# Patient Record
Sex: FEMALE | Race: WHITE | NOT HISPANIC OR LATINO | Employment: OTHER | URBAN - METROPOLITAN AREA
[De-identification: names, ages, dates, MRNs, and addresses within clinical notes are randomized per-mention and may not be internally consistent; named-entity substitution may affect disease eponyms.]

---

## 2021-06-22 ENCOUNTER — TRANSCRIBE ORDERS (OUTPATIENT)
Dept: DIABETES SERVICES | Facility: HOSPITAL | Age: 66
End: 2021-06-22

## 2021-06-22 DIAGNOSIS — E11.9 TYPE 2 DIABETES MELLITUS WITHOUT COMPLICATION, UNSPECIFIED WHETHER LONG TERM INSULIN USE (HCC): Primary | ICD-10-CM

## 2021-07-05 ENCOUNTER — HOSPITAL ENCOUNTER (OUTPATIENT)
Dept: DIABETES SERVICES | Facility: HOSPITAL | Age: 66
Setting detail: RECURRING SERIES
Discharge: HOME | End: 2021-07-05

## 2021-07-05 NOTE — CONSULTS
Diabetes Education    Patient Name:  Coni Steele  YOB: 1955  MRN: [id number]  Admit Date:  7/5/2021        Ms. Steele attended initial diabetes education class--60 minute visit with RN and RD over the phone. This medical referred consult was provided as a telephone call, tele-health or e-visit, as patient is unable to attend an in-office appointment due to the COVID-19 crisis. Consent for treatment was given verbally.Please see media tab for assessment and notes if you use [facility]. If you are not an [facility] user a copy of patient's assessment and notes will be sent per routine. Thank you.       Electronically signed by:  Mariama Dorsey RN, [facility]  07/05/21 10:35 EDT

## 2021-07-28 ENCOUNTER — HOSPITAL ENCOUNTER (OUTPATIENT)
Dept: DIABETES SERVICES | Facility: HOSPITAL | Age: 66
Setting detail: RECURRING SERIES
Discharge: HOME | End: 2021-07-28

## 2023-07-07 PROBLEM — R25.1 TREMORS OF NERVOUS SYSTEM: Status: ACTIVE | Noted: 2023-07-07

## 2023-07-07 PROBLEM — F31.9 BIPOLAR DISORDER: Status: ACTIVE | Noted: 2023-07-07

## 2023-07-07 PROBLEM — E11.65 TYPE 2 DIABETES MELLITUS WITH HYPERGLYCEMIA, WITHOUT LONG-TERM CURRENT USE OF INSULIN: Status: ACTIVE | Noted: 2023-07-07

## 2023-07-07 PROBLEM — I10 PRIMARY HYPERTENSION: Status: ACTIVE | Noted: 2023-07-07

## 2023-08-01 ENCOUNTER — TELEPHONE (OUTPATIENT)
Dept: FAMILY MEDICINE CLINIC | Facility: CLINIC | Age: 68
End: 2023-08-01
Payer: MEDICARE

## 2023-08-01 DIAGNOSIS — I10 PRIMARY HYPERTENSION: ICD-10-CM

## 2023-08-01 DIAGNOSIS — E11.65 TYPE 2 DIABETES MELLITUS WITH HYPERGLYCEMIA, WITHOUT LONG-TERM CURRENT USE OF INSULIN: Primary | ICD-10-CM

## 2023-08-02 RX ORDER — CALCIUM POLYCARBOPHIL 625 MG
625 TABLET ORAL DAILY PRN
COMMUNITY

## 2023-08-09 ENCOUNTER — OFFICE VISIT (OUTPATIENT)
Dept: BEHAVIORAL HEALTH | Facility: CLINIC | Age: 68
End: 2023-08-09
Payer: MEDICARE

## 2023-08-09 VITALS
DIASTOLIC BLOOD PRESSURE: 88 MMHG | SYSTOLIC BLOOD PRESSURE: 126 MMHG | HEART RATE: 98 BPM | BODY MASS INDEX: 22.86 KG/M2 | HEIGHT: 63 IN | WEIGHT: 129 LBS

## 2023-08-09 DIAGNOSIS — G25.9 EXTRAPYRAMIDAL AND MOVEMENT DISORDER: Primary | ICD-10-CM

## 2023-08-09 DIAGNOSIS — F31.70 BIPOLAR DISORDER IN FULL REMISSION, MOST RECENT EPISODE UNSPECIFIED TYPE: ICD-10-CM

## 2023-08-09 RX ORDER — ASENAPINE 5 MG/1
5 TABLET SUBLINGUAL NIGHTLY
Qty: 30 TABLET | Refills: 1 | Status: SHIPPED | OUTPATIENT
Start: 2023-08-09

## 2023-08-09 RX ORDER — BENZTROPINE MESYLATE 1 MG/1
1 TABLET ORAL 2 TIMES DAILY
Qty: 60 TABLET | Refills: 2 | Status: SHIPPED | OUTPATIENT
Start: 2023-08-09

## 2023-08-09 NOTE — PATIENT INSTRUCTIONS
Initiate Cogentin 1 mg twice a day.  Take with food.  Decrease Saphris to 5 mg nightly.  May take half tablet of current medication.  Discontinue Primidone: Take 1/2 tablet for 1 week then stop.
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
## 2023-08-10 ENCOUNTER — TELEPHONE (OUTPATIENT)
Dept: FAMILY MEDICINE CLINIC | Facility: CLINIC | Age: 68
End: 2023-08-10
Payer: MEDICARE

## 2023-08-10 NOTE — TELEPHONE ENCOUNTER
Caller: Coni Steele    Relationship: Self    Best call back number: [phone]    What medications are you currently taking:   Current Outpatient Medications on File Prior to Visit   Medication Sig Dispense Refill    asenapine maleate (SAPHRIS) 5 MG sublingual tablet sublingual tablet Place 1 tablet under the tongue Every Night. 30 tablet 1    B Complex Vitamins (vitamin b complex) capsule capsule Take 1 capsule by mouth Daily. Taking qod      benztropine (COGENTIN) 1 MG tablet Take 1 tablet by mouth 2 (Two) Times a Day. 60 tablet 2    diphenoxylate-atropine (LOMOTIL) 2.5-0.025 MG per tablet Take 2 tablets by mouth 2 (Two) Times a Day As Needed.      estradiol (ESTRACE) 0.1 MG/GM vaginal cream Insert  into the vagina Daily.      ferrous sulfate 324 (65 Fe) MG tablet delayed-release EC tablet Take 1 tablet by mouth Daily With Breakfast. Taking qod      glimepiride (AMARYL) 4 MG tablet Take 1 tablet by mouth 2 (Two) Times a Day. 180 tablet 3    lamoTRIgine (LaMICtal) 150 MG tablet Take 2 tablets by mouth 2 (Two) Times a Day.      lisinopril (PRINIVIL,ZESTRIL) 40 MG tablet Take 1 tablet by mouth Every Morning. 90 tablet 3    metFORMIN ER (GLUCOPHAGE-XR) 500 MG 24 hr tablet Take 2 tablets by mouth Every 12 (Twelve) Hours. 360 tablet 3    multivitamin with minerals tablet tablet Take 1 tablet by mouth Daily.      ondansetron (ZOFRAN) 4 MG tablet Take 1 tablet by mouth Every 8 (Eight) Hours As Needed. for nausea      polycarbophil (calcium polycarbophil) 625 MG tablet tablet Take 1 tablet by mouth Daily As Needed.      rosuvastatin (Crestor) 5 MG tablet Take 1 tablet by mouth Daily. 90 tablet 3     No current facility-administered medications on file prior to visit.       What are your concerns: PT CALLED STATED THAT RX     benztropine (COGENTIN) 1 MG tablet    NEEDS A PA.

## 2023-08-10 NOTE — TELEPHONE ENCOUNTER
Appears someone submitted PA per Media tab on 8/9. However, I'm unable to locate pt or that Key in CoverMyMeds. Key: [id number]

## 2023-08-10 NOTE — TELEPHONE ENCOUNTER
PA Case: [id number], Status: Approved, Coverage Starts on: 5/11/2023 12:00:00 AM, Coverage Ends on: 8/9/2024 12:00:00 AM.

## 2023-08-15 ENCOUNTER — TELEPHONE (OUTPATIENT)
Dept: FAMILY MEDICINE CLINIC | Facility: CLINIC | Age: 68
End: 2023-08-15
Payer: MEDICARE

## 2023-08-15 NOTE — TELEPHONE ENCOUNTER
Diagnostic wire removed. Guidewire tip is intact.  Wire type: In-Q. Pt's daughter, Afsaneh, called concerned about her mother. She states the information that was taken this morning and informs that  Coni had not taken the  Crestor last night and did not take the Prinidone 50mg this morning.  All other meds were taken as normal.  Daughter has been added as the emergency contact and informed she needs to be added to the verbal release to be given information on her mother.

## 2023-08-15 NOTE — TELEPHONE ENCOUNTER
Patient called to check in about medication. Stated that as of 08/13 she has been shaky off balance and a little confused. She stated on 08/14 she had fallen 3 times. She said yesterday morning she woke up in the [facility] on the [place] with no memory of how she got there. She was not sure if she was sleep walking or what happen. She is concerned and just wanted to ask Bonifacio what she needed to do.

## 2023-08-15 NOTE — TELEPHONE ENCOUNTER
Called patient back regarding experience side effects.  She is likely experiencing side effects related to initiation of Cogentin.  I instructed patient to discontinue medication.  She will continue on the decreased dose of Saphris 5 mg.    Patient is to contact me if dizziness does not resolve after a few days of stopping Cogentin.    Instructed patient to follow-up with me as originally scheduled.    Thanks

## 2023-08-16 ENCOUNTER — TELEPHONE (OUTPATIENT)
Dept: FAMILY MEDICINE CLINIC | Facility: CLINIC | Age: 68
End: 2023-08-16
Payer: MEDICARE

## 2023-08-16 DIAGNOSIS — R30.0 DYSURIA: Primary | ICD-10-CM

## 2023-08-16 LAB
BILIRUB BLD-MCNC: NEGATIVE MG/DL
CLARITY, POC: CLEAR
COLOR UR: YELLOW
GLUCOSE UR STRIP-MCNC: NEGATIVE MG/DL
KETONES UR QL: NEGATIVE
LEUKOCYTE EST, POC: ABNORMAL
NITRITE UR-MCNC: NEGATIVE MG/ML
PH UR: 6 [PH] (ref 5–8)
PROT UR STRIP-MCNC: NEGATIVE MG/DL
RBC # UR STRIP: NEGATIVE /UL
SP GR UR: 1.01 (ref 1–1.03)
UROBILINOGEN UR QL: NORMAL

## 2023-08-16 PROCEDURE — 81002 URINALYSIS NONAUTO W/O SCOPE: CPT | Performed by: FAMILY MEDICINE

## 2023-08-16 NOTE — TELEPHONE ENCOUNTER
Caller: Coni Steele    Relationship: Self    Best call back number: [phone]    What orders are you requesting (i.e. lab or imaging): ORDER FOR URANALYSIS     In what timeframe would the patient need to come in: 8/16/23    Where will you receive your lab/imaging services: IN OFFICE     Additional notes: PATIENT HAS HAD LOWER BACK PAIN AND HOT URINE, AND FREQUENCY .  PATIENT WANTS TO LEAVE A URINE SAMP,LE TO TESTED TODAY

## 2023-08-18 LAB
BACTERIA UR CULT: NO GROWTH
BACTERIA UR CULT: NORMAL
MICROALBUMIN UR-MCNC: 31.7 UG/ML

## 2023-08-22 ENCOUNTER — TELEPHONE (OUTPATIENT)
Dept: FAMILY MEDICINE CLINIC | Facility: CLINIC | Age: 68
End: 2023-08-22
Payer: MEDICARE

## 2023-08-22 NOTE — TELEPHONE ENCOUNTER
Patient received voicemail that Urine Culture was Negative. She was curious if Bravo knows any other reason she could still be having symptoms.

## 2023-09-08 ENCOUNTER — OFFICE VISIT (OUTPATIENT)
Dept: FAMILY MEDICINE CLINIC | Facility: CLINIC | Age: 68
End: 2023-09-08
Payer: MEDICARE

## 2023-09-08 ENCOUNTER — OFFICE VISIT (OUTPATIENT)
Dept: BEHAVIORAL HEALTH | Facility: CLINIC | Age: 68
End: 2023-09-08
Payer: MEDICARE

## 2023-09-08 VITALS
RESPIRATION RATE: 16 BRPM | DIASTOLIC BLOOD PRESSURE: 52 MMHG | HEART RATE: 84 BPM | SYSTOLIC BLOOD PRESSURE: 122 MMHG | OXYGEN SATURATION: 99 % | BODY MASS INDEX: 22.22 KG/M2 | HEIGHT: 63 IN | TEMPERATURE: 97.3 F | WEIGHT: 125.4 LBS

## 2023-09-08 VITALS
WEIGHT: 129 LBS | BODY MASS INDEX: 22.86 KG/M2 | SYSTOLIC BLOOD PRESSURE: 94 MMHG | HEIGHT: 63 IN | DIASTOLIC BLOOD PRESSURE: 62 MMHG | HEART RATE: 95 BPM

## 2023-09-08 DIAGNOSIS — F51.05 INSOMNIA DUE TO OTHER MENTAL DISORDER: ICD-10-CM

## 2023-09-08 DIAGNOSIS — G25.9 EXTRAPYRAMIDAL AND MOVEMENT DISORDER: ICD-10-CM

## 2023-09-08 DIAGNOSIS — K52.9 CHRONIC DIARRHEA: ICD-10-CM

## 2023-09-08 DIAGNOSIS — E11.649 TYPE 2 DIABETES MELLITUS WITH HYPOGLYCEMIA WITHOUT COMA, WITHOUT LONG-TERM CURRENT USE OF INSULIN: Primary | ICD-10-CM

## 2023-09-08 DIAGNOSIS — R39.15 URINARY URGENCY: ICD-10-CM

## 2023-09-08 DIAGNOSIS — E53.8 VITAMIN B12 DEFICIENCY: ICD-10-CM

## 2023-09-08 DIAGNOSIS — F31.70 BIPOLAR DISORDER IN FULL REMISSION, MOST RECENT EPISODE UNSPECIFIED TYPE: Primary | ICD-10-CM

## 2023-09-08 DIAGNOSIS — F99 INSOMNIA DUE TO OTHER MENTAL DISORDER: ICD-10-CM

## 2023-09-08 DIAGNOSIS — I10 PRIMARY HYPERTENSION: ICD-10-CM

## 2023-09-08 LAB
EXPIRATION DATE: ABNORMAL
HBA1C MFR BLD: 6.7 %
Lab: ABNORMAL

## 2023-09-08 RX ORDER — GLIPIZIDE 2.5 MG/1
2.5 TABLET, EXTENDED RELEASE ORAL DAILY
Qty: 90 TABLET | Refills: 0 | Status: SHIPPED | OUTPATIENT
Start: 2023-09-08

## 2023-09-08 RX ORDER — LAMOTRIGINE 150 MG/1
150 TABLET ORAL 2 TIMES DAILY
Qty: 60 TABLET | Refills: 2 | Status: SHIPPED | OUTPATIENT
Start: 2023-09-08

## 2023-09-08 NOTE — PROGRESS NOTES
"     Office  Follow Up Visit      Patient Name: Coni Steele  : 1955   MRN: [id number]     Referring Provider: Derrick Cordon MD    Chief Complaint: Medication follow-up    ICD-10-CM ICD-9-CM   1. Bipolar disorder in full remission, most recent episode unspecified type  F31.70 296.80   2. Extrapyramidal and movement disorder  G25.9 333.90   3. Insomnia due to other mental disorder  F51.05 300.9    F99 327.02        History of Present Illness:   Coni Steele is a 68 y.o. female who is here today for follow up related to medication management of bipolar disorder and EPS.  Patient was accompanied by her daughter.  Patient reports that she discontinued benztropine related to side effects which included significant lightheadedness and dizziness.  She reports she has not seen any increase in her EPS symptoms since discontinuing the medication.  Patient reports that she did wean herself off Saphris and states \"I feel good, I feel good mentally right now.\"  Patient denies any manic or depressive symptoms at this time.  Patient affirms since stopping Saphris she has seen a significant increase in sleep disturbances and difficulty with sleep onset and frequent waking.  Patient also reports that she wakes up more than 3 times a night and will have to use the restroom.  She also reports that she has seen that her blood sugars continuously dropping at nighttime.      Subjective      Review of Systems:   Review of Systems   Constitutional: Negative.    Respiratory: Negative.     Cardiovascular: Negative.    Gastrointestinal: Negative.    Genitourinary: Negative.    Musculoskeletal: Negative.    Neurological: Negative.    Psychiatric/Behavioral:  Positive for sleep disturbance.        Patient History:   The following portions of the patient's history were reviewed and updated as appropriate: allergies, current medications, past family history, past medical history, past social history, past surgical history and " "problem list.     Social:  No change in interval history.    Medications:     Current Outpatient Medications:     lamoTRIgine (LaMICtal) 150 MG tablet, Take 1 tablet by mouth 2 (Two) Times a Day., Disp: 60 tablet, Rfl: 2    B Complex Vitamins (vitamin b complex) capsule capsule, Take 1 capsule by mouth Daily. Taking qod, Disp: , Rfl:     diphenoxylate-atropine (LOMOTIL) 2.5-0.025 MG per tablet, Take 2 tablets by mouth 2 (Two) Times a Day As Needed., Disp: , Rfl:     estradiol (ESTRACE) 0.1 MG/GM vaginal cream, Insert  into the vagina Daily., Disp: , Rfl:     ferrous sulfate 324 (65 Fe) MG tablet delayed-release EC tablet, Take 1 tablet by mouth Daily With Breakfast. Taking qod, Disp: , Rfl:     glimepiride (AMARYL) 4 MG tablet, Take 1 tablet by mouth 2 (Two) Times a Day., Disp: 180 tablet, Rfl: 3    lisinopril (PRINIVIL,ZESTRIL) 40 MG tablet, Take 1 tablet by mouth Every Morning., Disp: 90 tablet, Rfl: 3    metFORMIN ER (GLUCOPHAGE-XR) 500 MG 24 hr tablet, Take 2 tablets by mouth Every 12 (Twelve) Hours., Disp: 360 tablet, Rfl: 3    multivitamin with minerals tablet tablet, Take 1 tablet by mouth Daily., Disp: , Rfl:     ondansetron (ZOFRAN) 4 MG tablet, Take 1 tablet by mouth Every 8 (Eight) Hours As Needed. for nausea, Disp: , Rfl:     polycarbophil (calcium polycarbophil) 625 MG tablet tablet, Take 1 tablet by mouth Daily As Needed., Disp: , Rfl:     rosuvastatin (Crestor) 5 MG tablet, Take 1 tablet by mouth Daily., Disp: 90 tablet, Rfl: 3    Objective     Physical Exam:  Vital Signs:   Vitals:    09/08/23 0946   BP: 94/62   Pulse: 95   Weight: 58.5 kg (129 lb)   Height: 158.8 cm (62.5\")     Body mass index is 23.22 kg/m².     Mental Status Exam:   Hygiene:   good  Cooperation:  Cooperative  Eye Contact:  Good  Psychomotor Behavior:  Appropriate  Affect:  Appropriate  Mood: euthymic  Speech:  Normal  Thought Process:  Goal directed  Thought Content:  Mood congruent  Suicidal:  None  Homicidal:  " None  Hallucinations:  None  Delusion:  None  Memory:  Intact  Orientation:  Grossly intact  Reliability:  good  Insight:  Fair  Judgement:  Fair  Impulse Control:  Good  Physical/Medical Issues:  Yes several medical comorbidities, see chart      Assessment / Plan      Visit Diagnosis/Orders Placed This Visit:  Diagnoses and all orders for this visit:    1. Bipolar disorder in full remission, most recent episode unspecified type (Primary)  -     lamoTRIgine (LaMICtal) 150 MG tablet; Take 1 tablet by mouth 2 (Two) Times a Day.  Dispense: 60 tablet; Refill: 2    2. Extrapyramidal and movement disorder    3. Insomnia due to other mental disorder     -Reinitiate trazodone 50 mg from previous prescription.  Patient is to contact me on Tuesday to inform me if she has seen an improvement in her insomnia.    Differential:   N/A    Plan:   Continue lamotrigine 150 mg twice a day.  Reinitiate trazodone 50 mg for insomnia.  Instructed patient to initiate Benadryl over-the-counter for EPS.  Follow-up with primary care provider as scheduled to address blood sugar and urinary frequency.  PCP appointment scheduled on 10/20/2023.    Continue supportive psychotherapy efforts and medications as indicated. Treatment and medication options discussed during today's visit. Patient ackowledged and verbally consented to continue with current treatment plan and was educated on the importance of compliance with treatment and follow-up appointments. Patient seems reasonably able to adhere to treatment plan.      Medication Considerations:  Discussed medication options and treatment plan of prescribed medication(s) as well as the risks, benefits, and potential side effects. Patient is agreeable to call the office with any worsening of symptoms or onset of side effects. Patient is agreeable to call 911 or go to the nearest ER should he/she begin having SI/HI.    Quality Measures:   Never smoker    I advised Coni Steele of the risks of tobacco  use.     Follow Up:   Return in about 1 month (around 10/8/2023) for Recheck.      MARY JO Huerta, PMHNP-BC

## 2023-09-08 NOTE — PROGRESS NOTES
"Chief Complaint   Patient presents with    bladder and low sugar     Pt stated getting up at least 3 times at night to urinate and doesn't feel bladder is emptying-stated that blood sugar has been running low ex:45 pt got up ate yogurt and drank chocolate milk retook sugar level 179       Subjective      Coni Steele is a 68 y.o. who presents for multiple concerns. She is accompanied by her daughter.    DM. Having nocturnal hypoglycemia. Multiple events with glucose to the 40's    Lightheadedness. Psych has suggested bp evaluation. She does not check blood pressure at home.    Urinary urgency/frequency. Occurs day and night with night time symptoms being most significant. Occasional incontinence. Reports urine feels hot. Culture last month was negative.     Diarrhea. Primarily occurs with fruits, raw and cooked veggies.     Nausea. Impairs appetite. Weight down 4 lbs    Objective   Vital Signs:  /52   Pulse 84   Temp 97.3 °F (36.3 °C)   Resp 16   Ht 158.8 cm (62.5\")   Wt 56.9 kg (125 lb 6.4 oz)   SpO2 99%   BMI 22.57 kg/m²     Physical Exam  Vitals reviewed.   Constitutional:       Appearance: Normal appearance.   Neurological:      Mental Status: She is alert.        Result Review   The following data was reviewed by: Derrick Cordon MD on 09/08/2023:  Microalbumin          8/16/2023    14:37   Microalbumin   Microalbumin, Urine 31.7      Data reviewed : A1c 6.7               Assessment and Plan  Diagnoses and all orders for this visit:    1. Type 2 diabetes mellitus with hypoglycemia without coma, without long-term current use of insulin (Primary)  Comments:  Control improved. Stop Amaryl. Start Low dose Glipizide ER. Reassure glucose being slightly higher will not be harmful.  Orders:  -     POC Glycosylated Hemoglobin (Hb A1C)  -     Basic Metabolic Panel  -     glipizide (GLUCOTROL XL) 2.5 MG 24 hr tablet; Take 1 tablet by mouth Daily.  Dispense: 90 tablet; Refill: 0    2. Primary " hypertension  Comments:  Controlled. NO change to regimen at this time. Surveillance labs ordered  Orders:  -     Basic Metabolic Panel  -     CBC & Differential    3. Chronic diarrhea  Comments:  Hold metformin for 2 weeks and return for reassessment.  Orders:  -     Hepatic Function Panel    4. Vitamin B12 deficiency  Comments:  Surveillance labs ordered  Orders:  -     Vitamin B12    5. Urinary urgency  Comments:  May try anticholinergic in the future. Will wait due to multiple med adjustments            Follow Up  No follow-ups on file.  Patient was given instructions and counseling regarding her condition or for health maintenance advice. Please see specific information pulled into the AVS if appropriate.

## 2023-09-09 LAB
ALBUMIN SERPL-MCNC: 4.8 G/DL (ref 3.9–4.9)
ALP SERPL-CCNC: 69 IU/L (ref 44–121)
ALT SERPL-CCNC: 13 IU/L (ref 0–32)
AST SERPL-CCNC: 19 IU/L (ref 0–40)
BASOPHILS # BLD AUTO: 0 X10E3/UL (ref 0–0.2)
BASOPHILS NFR BLD AUTO: 0 %
BILIRUB DIRECT SERPL-MCNC: <0.1 MG/DL (ref 0–0.4)
BILIRUB SERPL-MCNC: <0.2 MG/DL (ref 0–1.2)
BUN SERPL-MCNC: 27 MG/DL (ref 8–27)
BUN/CREAT SERPL: 23 (ref 12–28)
CALCIUM SERPL-MCNC: 10.7 MG/DL (ref 8.7–10.3)
CHLORIDE SERPL-SCNC: 95 MMOL/L (ref 96–106)
CO2 SERPL-SCNC: 22 MMOL/L (ref 20–29)
CREAT SERPL-MCNC: 1.19 MG/DL (ref 0.57–1)
EGFRCR SERPLBLD CKD-EPI 2021: 49 ML/MIN/1.73
EOSINOPHIL # BLD AUTO: 0.1 X10E3/UL (ref 0–0.4)
EOSINOPHIL NFR BLD AUTO: 1 %
ERYTHROCYTE [DISTWIDTH] IN BLOOD BY AUTOMATED COUNT: 12.7 % (ref 11.7–15.4)
GLUCOSE SERPL-MCNC: 292 MG/DL (ref 70–99)
HCT VFR BLD AUTO: 38.1 % (ref 34–46.6)
HGB BLD-MCNC: 12.5 G/DL (ref 11.1–15.9)
IMM GRANULOCYTES # BLD AUTO: 0 X10E3/UL (ref 0–0.1)
IMM GRANULOCYTES NFR BLD AUTO: 1 %
LYMPHOCYTES # BLD AUTO: 1.2 X10E3/UL (ref 0.7–3.1)
LYMPHOCYTES NFR BLD AUTO: 19 %
MCH RBC QN AUTO: 31.1 PG (ref 26.6–33)
MCHC RBC AUTO-ENTMCNC: 32.8 G/DL (ref 31.5–35.7)
MCV RBC AUTO: 95 FL (ref 79–97)
MONOCYTES # BLD AUTO: 0.4 X10E3/UL (ref 0.1–0.9)
MONOCYTES NFR BLD AUTO: 6 %
NEUTROPHILS # BLD AUTO: 4.5 X10E3/UL (ref 1.4–7)
NEUTROPHILS NFR BLD AUTO: 73 %
PLATELET # BLD AUTO: 275 X10E3/UL (ref 150–450)
POTASSIUM SERPL-SCNC: 4.9 MMOL/L (ref 3.5–5.2)
PROT SERPL-MCNC: 6.8 G/DL (ref 6–8.5)
RBC # BLD AUTO: 4.02 X10E6/UL (ref 3.77–5.28)
SODIUM SERPL-SCNC: 136 MMOL/L (ref 134–144)
VIT B12 SERPL-MCNC: 443 PG/ML (ref 232–1245)
WBC # BLD AUTO: 6.1 X10E3/UL (ref 3.4–10.8)

## 2023-09-22 ENCOUNTER — TELEPHONE (OUTPATIENT)
Dept: FAMILY MEDICINE CLINIC | Facility: CLINIC | Age: 68
End: 2023-09-22

## 2023-09-22 ENCOUNTER — OFFICE VISIT (OUTPATIENT)
Dept: FAMILY MEDICINE CLINIC | Facility: CLINIC | Age: 68
End: 2023-09-22
Payer: MEDICARE

## 2023-09-22 VITALS
DIASTOLIC BLOOD PRESSURE: 78 MMHG | HEART RATE: 96 BPM | BODY MASS INDEX: 20.73 KG/M2 | WEIGHT: 117 LBS | TEMPERATURE: 97.5 F | HEIGHT: 63 IN | RESPIRATION RATE: 16 BRPM | SYSTOLIC BLOOD PRESSURE: 130 MMHG

## 2023-09-22 DIAGNOSIS — R41.89 COGNITIVE IMPAIRMENT: ICD-10-CM

## 2023-09-22 DIAGNOSIS — K52.9 CHRONIC DIARRHEA: ICD-10-CM

## 2023-09-22 DIAGNOSIS — E11.649 TYPE 2 DIABETES MELLITUS WITH HYPOGLYCEMIA WITHOUT COMA, WITHOUT LONG-TERM CURRENT USE OF INSULIN: ICD-10-CM

## 2023-09-22 DIAGNOSIS — E11.65 TYPE 2 DIABETES MELLITUS WITH HYPERGLYCEMIA, WITHOUT LONG-TERM CURRENT USE OF INSULIN: Primary | ICD-10-CM

## 2023-09-22 PROCEDURE — 3078F DIAST BP <80 MM HG: CPT | Performed by: FAMILY MEDICINE

## 2023-09-22 PROCEDURE — 3044F HG A1C LEVEL LT 7.0%: CPT | Performed by: FAMILY MEDICINE

## 2023-09-22 PROCEDURE — 3075F SYST BP GE 130 - 139MM HG: CPT | Performed by: FAMILY MEDICINE

## 2023-09-22 PROCEDURE — 99214 OFFICE O/P EST MOD 30 MIN: CPT | Performed by: FAMILY MEDICINE

## 2023-09-22 RX ORDER — GLIPIZIDE 5 MG/1
5 TABLET, FILM COATED, EXTENDED RELEASE ORAL DAILY
Qty: 30 TABLET | Refills: 0 | Status: SHIPPED | OUTPATIENT
Start: 2023-09-22

## 2023-09-22 RX ORDER — BLOOD SUGAR DIAGNOSTIC
STRIP MISCELLANEOUS
Qty: 30 EACH | Refills: 12 | Status: SHIPPED | OUTPATIENT
Start: 2023-09-22

## 2023-09-22 NOTE — TELEPHONE ENCOUNTER
Trouble with thoughts they are consuming and forgetting on a regular bases about little task. Stated that for a example if she us nauseous, she can't remember to take a nausea pill due to feeling like her heads spinning with thoughts and just can't remember to take a pill due to this. Wanted to let tara know a update in regards to this

## 2023-09-22 NOTE — PROGRESS NOTES
"Chief Complaint   Patient presents with    2wk f/u diarrhea     Noticed a drop in her wt. Stated she's been nauseous.        Subjective      Coni Steele is a 68 y.o. who presents for diabetes, chronic diarrhea.  At last visit we made medication adjustments to diminish hypoglycemia and see if discontinuation of metformin resolved diarrhea.  Patient is accompanied by her daughter.  Unfortunately the patient has poor memory and does not recall any blood sugars other than a glucose of 380 this morning.  She brings in a piece of paper that would indicate she took metformin this morning and has not taken her glipizide.  She believes the diarrhea may be better and the daughter seems to believe patient has mostly stopped metformin.  Patient's daughter became quite upset when witnessing her mother's cognitive impairment    The following portions of the patient's history were reviewed and updated as appropriate: allergies, current medications, past family history, past medical history, past social history, past surgical history, and problem list.    Review of Systems    Objective   Vital Signs:  /78   Pulse 96   Temp 97.5 °F (36.4 °C)   Resp 16   Ht 158.8 cm (62.5\")   Wt 53.1 kg (117 lb)   BMI 21.06 kg/m²     BMI is within normal parameters. No other follow-up for BMI required.        Physical Exam  Vitals reviewed.   Constitutional:       Appearance: Normal appearance.   Cardiovascular:      Rate and Rhythm: Normal rate and regular rhythm.   Pulmonary:      Effort: Pulmonary effort is normal.   Neurological:      Mental Status: She is alert.        Result Review                     Assessment and Plan  Diagnoses and all orders for this visit:    1. Type 2 diabetes mellitus with hyperglycemia, without long-term current use of insulin (Primary)  -     glucose blood (Accu-Chek Whit Plus) test strip; Check blood sugar daily  Dispense: 30 each; Refill: 12    2. Chronic diarrhea    3. Type 2 diabetes mellitus with " hypoglycemia without coma, without long-term current use of insulin  Comments:  Control improved. Stop Amaryl. Start Low dose Glipizide ER. Reassure glucose being slightly higher will not be harmful.  Orders:  -     glipizide (GLUCOTROL XL) 5 MG ER tablet; Take 1 tablet by mouth Daily.  Dispense: 30 tablet; Refill: 0    4. Cognitive impairment    Plan  1.  Increase glipizide ER to 5 mg daily.  Return to office in 2 weeks with a list of daily blood sugars  2.  Discontinue metformin  3.  Patient's daughter will try to make sure patient follows the plan and will accompany her mother to visits        Follow Up  No follow-ups on file.  Patient was given instructions and counseling regarding her condition or for health maintenance advice. Please see specific information pulled into the AVS if appropriate.

## 2023-09-22 NOTE — TELEPHONE ENCOUNTER
Caller: SHAW CARRERA    Relationship: Emergency Contact    Best call back number:       [phone] (Home)     Which medication are you concerned about:       glipizide (GLUCOTROL XL) 5 MG ER tablet     Who prescribed you this medication:     DR SALMON    What are your concerns:     CALLER/DAUGHTER STATED MEDICATION LISTED ABOVE AFTER INCREASING DOSAGE TO (5) MG WAS TO BE FORWARDED TO PHARMACY FOR FILLING    CALLER STATED PHARMACY HAS NOT RECEIVED THE PRESCRIPTION FILL AS OF YET    PREFERRED PHARMACY:    [facility] - [place], [place]    TELEPHONE CONTACT:    [phone]

## 2023-09-26 ENCOUNTER — TELEPHONE (OUTPATIENT)
Dept: FAMILY MEDICINE CLINIC | Facility: CLINIC | Age: 68
End: 2023-09-26
Payer: MEDICARE

## 2023-09-26 NOTE — TELEPHONE ENCOUNTER
BRIAN IS VERY UNSTABLE STABLE ON HER FEET. SHE ISN'T ABLE TO STAND FOR PERIODS OF TIMES. PT ALSO HAD FALLEN YESTERDAY 2 TIMES. SHE HIT HER HEAD ONCE WHEN SHE HAD FALLEN DOWN THE STAIRS. SHE WAS NOT ABLE TO TELL HER DAUGHTER WHEN SHE HAD FALLEN. NOW SHE HAS A BLACK EYE ON THE RIGHT SIDE AND IS TENDER TO THE TOUCH.       HER DAUGHTER HAD ALSO TAKEN HER BLOOD SUGAR YESTERDAY @ 530P - 239  09/26- 7:30am - 150    SHAW STATED THAT SHE IS GOING TO TAKE HER TO THE HOSPITAL DUE TO SHE IS REALLY SPACEY AND NOT HER SELF.

## 2023-09-26 NOTE — TELEPHONE ENCOUNTER
WANTED TO UPDATE THE SUGAR NUMBERS SHE WAS MISTAKEN BEFORE   239 AT 5:30PM YESTERDAY AFTER CH[employer]EN AND RICE   150 AT 11PM YESTERDAY   208 AT 7AM TODAY   263 AT 8:55AM TODAY AFTER EATING A SMALL LOW SUGAR YOGART

## 2023-09-26 NOTE — TELEPHONE ENCOUNTER
Based on the fall and injury sustained from this it sounds like she could have a concussion.  I would recommend taking her to the emergency room

## 2023-09-26 NOTE — TELEPHONE ENCOUNTER
Caller: SHAW CARRERA    Relationship: Emergency Contact    Best call back number: [phone]     What is the best time to reach you: ANY    Who are you requesting to speak with (clinical staff, provider,  specific staff member): CLINICAL        What was the call regarding: PATIENTS DAUGHTER SHAW STATES HER MOM IS HAVING DIFFICULTY REMEMBERING THINGS AND SHE HAD A FALL LAST NIGHT. SHAW STATES SHE IS FREQUENTLY DIZZY. SHE IS VERY CONCERNED AND WOULD LIKE A CALL BACK TO DISCUSS.

## 2023-10-03 ENCOUNTER — TELEPHONE (OUTPATIENT)
Dept: FAMILY MEDICINE CLINIC | Facility: CLINIC | Age: 68
End: 2023-10-03
Payer: MEDICARE

## 2023-10-03 NOTE — TELEPHONE ENCOUNTER
Caller: SHAW CARRERA    Relationship to patient: Emergency Contact    Best call back number: [phone]     PATIENT'S DAUGHTER STATES THAT SHE HAS BEEN HOSPITALIZED AT [facility]  FOR A FALL, BUT HER SUGAR IS OVER 200 AND SHE IS WANTING TO TALK WITH DR SALMON TO SEE WHAT NEEDS TO BE DONE.

## 2023-10-04 NOTE — TELEPHONE ENCOUNTER
While the patient is hospitalized she is likely getting insulin injections.  I cannot make any changes while she is in the hospital.

## 2023-10-19 ENCOUNTER — TELEPHONE (OUTPATIENT)
Dept: FAMILY MEDICINE CLINIC | Facility: CLINIC | Age: 68
End: 2023-10-19

## 2023-10-19 NOTE — TELEPHONE ENCOUNTER
Hub staff attempted to follow warm transfer process and was unsuccessful     Caller: SHAW CARRERA    Relationship to patient: Emergency Contact    Best call back number     Patient is needing: PATIENT IS IN REHAB CURRENTLY AND DAUGHTER ADVISED THAT THE PATIENTS SUGAR LEVELS HAVE NOT BEEN STABLE; SHE WAS SCHEDULED WITH DR SALMON TOMORROW FOR JUST A REGULAR OFFICE VISIT, BUT THIS WOULD NEED TO BE A HOSPITAL FOLLOW UP; SHE HAS CONCERNS ABOUT THIS APPTFOR TOMORROW    PLEASE CALL TO ADVISE

## 2023-10-19 NOTE — TELEPHONE ENCOUNTER
Talked to daughter- she is wanting to wait on a appointment due to her being in rehab and being moved over to a independent living but this has not happened yet. Daughter is concerned due to sugar levels being in the 200's. Wanted to know if dr snow can change her medication around again? She said rehab has just been giving insulin to keep sugar down.     Daughter wanted to know if she needs a ivan they can make one for this but just wanted guidance from dr snow what he thought was best?

## 2023-10-19 NOTE — TELEPHONE ENCOUNTER
If the patient is currently in a facility there should be a medical doctor who assist with managing chronic diseases such as diabetes.  Where is the patient right now?  Where will she be going?

## 2023-10-20 NOTE — TELEPHONE ENCOUNTER
Daughter Afsaneh said she's at [facility] in [place] then going to [facility] in [place]. It's an independent living facility.     Informed her there should be a Dr there to assist in DM management etc. Suggested speaking with charge nurse if needed.

## 2023-10-27 ENCOUNTER — OFFICE VISIT (OUTPATIENT)
Dept: FAMILY MEDICINE CLINIC | Facility: CLINIC | Age: 68
End: 2023-10-27
Payer: MEDICARE

## 2023-10-27 VITALS
DIASTOLIC BLOOD PRESSURE: 80 MMHG | WEIGHT: 129.1 LBS | BODY MASS INDEX: 22.88 KG/M2 | HEART RATE: 55 BPM | HEIGHT: 63 IN | SYSTOLIC BLOOD PRESSURE: 120 MMHG | OXYGEN SATURATION: 98 %

## 2023-10-27 DIAGNOSIS — E11.65 TYPE 2 DIABETES MELLITUS WITH HYPERGLYCEMIA, WITHOUT LONG-TERM CURRENT USE OF INSULIN: Primary | ICD-10-CM

## 2023-10-27 DIAGNOSIS — N76.0 ACUTE VAGINITIS: ICD-10-CM

## 2023-10-27 DIAGNOSIS — Z13.29 SCREENING FOR THYROID DISORDER: ICD-10-CM

## 2023-10-27 DIAGNOSIS — E53.8 VITAMIN B12 DEFICIENCY: ICD-10-CM

## 2023-10-27 DIAGNOSIS — E55.9 VITAMIN D DEFICIENCY: ICD-10-CM

## 2023-10-27 DIAGNOSIS — F31.9 BIPOLAR AFFECTIVE DISORDER, REMISSION STATUS UNSPECIFIED: ICD-10-CM
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
## 2023-10-27 PROBLEM — R10.9 ABDOMINAL PAIN: Status: ACTIVE | Noted: 2023-09-26

## 2023-10-27 PROBLEM — F22 DELUSIONS: Status: ACTIVE | Noted: 2017-03-15

## 2023-10-27 LAB
BILIRUB BLD-MCNC: NEGATIVE MG/DL
CLARITY, POC: CLEAR
COLOR UR: YELLOW
EXPIRATION DATE: ABNORMAL
GLUCOSE UR STRIP-MCNC: ABNORMAL MG/DL
KETONES UR QL: NEGATIVE
LEUKOCYTE EST, POC: NEGATIVE
Lab: ABNORMAL
NITRITE UR-MCNC: NEGATIVE MG/ML
PH UR: 5 [PH] (ref 5–8)
PROT UR STRIP-MCNC: NEGATIVE MG/DL
RBC # UR STRIP: NEGATIVE /UL
SP GR UR: 1.01 (ref 1–1.03)
UROBILINOGEN UR QL: ABNORMAL

## 2023-10-27 RX ORDER — LINAGLIPTIN 5 MG/1
5 TABLET, FILM COATED ORAL DAILY
COMMUNITY
Start: 2023-10-21

## 2023-10-27 RX ORDER — PROPRANOLOL HYDROCHLORIDE 10 MG/1
10 TABLET ORAL 3 TIMES DAILY
COMMUNITY

## 2023-10-27 RX ORDER — FLUCONAZOLE 150 MG/1
150 TABLET ORAL DAILY
Qty: 2 TABLET | Refills: 0 | Status: SHIPPED | OUTPATIENT
Start: 2023-10-27 | End: 2023-10-29

## 2023-10-27 NOTE — PATIENT INSTRUCTIONS
Local Options for Counseling:  [facility]- [phone]  [facility]- [place] Office- [phone]  [place] [facility]- [phone]  [facility]- [phone]

## 2023-10-27 NOTE — PROGRESS NOTES
Office Note     Name: Coni Steele    : 1955     MRN: [id number]     Chief Complaint  Hospital Follow Up Visit    Subjective     History of Present Illness:  Coni Steele is a 68 y.o. female who presents today for eval after [place] [facility] [place]. She states that they got her medications adjusted, and she has been doing much better. She is following up with a therapist, but she does not have a psychiatrist.     She states that she lives at [facility], so she has some assistance.He daughter also helps her.     She c/o vaginal itching and irritation. She denies urinary symptoms.     Review of Systems:   Review of Systems   Constitutional:  Negative for fever.   Genitourinary:  Negative for dysuria, flank pain, hematuria, urgency, urinary incontinence and vaginal discharge.       Past Medical History:   Past Medical History:   Diagnosis Date    Anxiety     Depression     Diabetes mellitus     Hypertension        Past Surgical History:   Past Surgical History:   Procedure Laterality Date    APPENDECTOMY      CHOLECYSTECTOMY         Family History: History reviewed. No pertinent family history.    Social History:   Social History     Socioeconomic History    Marital status:    Tobacco Use    Smoking status: Never    Smokeless tobacco: Never   Vaping Use    Vaping Use: Never used   Substance and Sexual Activity    Alcohol use: Never    Drug use: Never    Sexual activity: Defer       Immunizations:   Immunization History   Administered Date(s) Administered    COVID-19 (PFIZER) Purple Cap Monovalent 2021, 2021, 2022    Flu Vaccine Quad PF 6-35MO 10/15/2019    Fluzone (or Fluarix & Flulaval for VFC) >6mos 2021    Tdap 2019        Medications:     Current Outpatient Medications:     glucose blood (Accu-Chek Whit Plus) test strip, Check blood sugar daily, Disp: 30 each, Rfl: 12    lamoTRIgine (LaMICtal) 150 MG tablet, Take 1 tablet  "by mouth 2 (Two) Times a Day., Disp: 60 tablet, Rfl: 2    lisinopril (PRINIVIL,ZESTRIL) 40 MG tablet, Take 1 tablet by mouth Every Morning., Disp: 90 tablet, Rfl: 3    metFORMIN (GLUCOPHAGE) 500 MG tablet, Take 1 tablet by mouth., Disp: , Rfl:     multivitamin with minerals tablet tablet, Take 1 tablet by mouth Daily., Disp: , Rfl:     ondansetron (ZOFRAN) 4 MG tablet, Take 1 tablet by mouth Every 8 (Eight) Hours As Needed. for nausea, Disp: , Rfl:     polycarbophil 625 MG tablet tablet, Take 1 tablet by mouth Daily As Needed., Disp: , Rfl:     Tradjenta 5 MG tablet tablet, Take 1 tablet by mouth Daily., Disp: , Rfl:     propranolol (INDERAL) 10 MG tablet, Take 1 tablet by mouth 3 (Three) Times a Day., Disp: , Rfl:     Allergies:   Allergies   Allergen Reactions    Phenothiazines Anaphylaxis    Shellfish-Derived Products Anaphylaxis    Phenylephrine Other (See Comments)     Patient does not know what her intolerance is    Indomethacin Unknown - Low Severity    Levofloxacin Other (See Comments)     'Felt like something was going to snap in the back of my leg'     Seroquel Xr [Quetiapine Fumarate Er] Unknown - Low Severity       Objective     Vital Signs  /80 (BP Location: Left arm, Patient Position: Sitting, Cuff Size: Adult)   Pulse 55   Ht 158.8 cm (62.5\")   Wt 58.6 kg (129 lb 1.6 oz)   SpO2 98%   BMI 23.24 kg/m²   Estimated body mass index is 23.24 kg/m² as calculated from the following:    Height as of this encounter: 158.8 cm (62.5\").    Weight as of this encounter: 58.6 kg (129 lb 1.6 oz).    BMI is within normal parameters. No other follow-up for BMI required.      Physical Exam  Vitals reviewed.   Constitutional:       General: She is not in acute distress.     Appearance: Normal appearance. She is not ill-appearing.   HENT:      Head: Normocephalic and atraumatic.   Cardiovascular:      Rate and Rhythm: Normal rate and regular rhythm.      Pulses: Normal pulses.      Heart sounds: Normal heart " sounds.   Pulmonary:      Effort: Pulmonary effort is normal. No respiratory distress.      Breath sounds: Normal breath sounds.   Neurological:      General: No focal deficit present.      Mental Status: She is alert and oriented to person, place, and time.   Psychiatric:         Mood and Affect: Mood normal.         Behavior: Behavior normal.       Results:   Brief Urine Lab Results  (Last result in the past 365 days)        Color   Clarity   Blood   Leuk Est   Nitrite   Protein   CREAT   Urine HCG        10/27/23 1256 Yellow   Clear   Negative   Negative   Negative   Negative                  Assessment and Plan     Assessment/Plan:  Diagnoses and all orders for this visit:    1. Type 2 diabetes mellitus with hyperglycemia, without long-term current use of insulin (Primary)  Assessment & Plan:  Diabetes status is unknown .   Continue current treatment regimen.  Diabetes will be reassessed in 3 months.    Orders:  -     Hemoglobin A1c; Future  -     CBC Auto Differential; Future  -     Comprehensive Metabolic Panel; Future  -     Lipid Panel; Future    2. Acute vaginitis  -     POC Urinalysis Dipstick, Automated  -     fluconazole (DIFLUCAN) 150 MG tablet; Take 1 tablet by mouth Daily for 2 doses. On Days 1 and 3.  Dispense: 2 tablet; Refill: 0    3. Bipolar affective disorder, remission status unspecified  Assessment & Plan:  I recommend that she continue with therapy, and we will schedule her with psychiatric medication management.     Orders:  -     Ambulatory Referral to Behavioral Health    4. Vitamin B12 deficiency  -     Vitamin B12; Future    5. Vitamin D deficiency  -     Vitamin D,25-Hydroxy; Future    6. Screening for thyroid disorder  -     Thyroid Cascade Profile; Future        Follow Up  Return in about 4 weeks (around 11/24/2023) for recheck.    Florence Gonasles PA-C  [facility]

## 2023-11-13 NOTE — TELEPHONE ENCOUNTER
Caller: SHAW CARRERA    Relationship: Emergency Contact    Best call back number: [phone]    Requested Prescriptions:   Requested Prescriptions     Pending Prescriptions Disp Refills    metFORMIN (GLUCOPHAGE) 500 MG tablet       Sig: Take 1 tablet by mouth.        Pharmacy where request should be sent: [facility] [id number] [place] [id number]   [address] - [phone]  - [phone] FX     Last office visit with prescribing clinician: 10/27/2023   Last telemedicine visit with prescribing clinician: Visit date not found   Next office visit with prescribing clinician: 11/20/2023     Additional details provided by patient: PATIENT IS OUT OF MEDICATION AND NEEDS REFILL SINCE DOSAGE CHANGE TO 1 TABLET BID.     Does the patient have less than a 3 day supply:  [x] Yes  [] No    Would you like a call back once the refill request has been completed: [x] Yes [] No    If the office needs to give you a call back, can they leave a voicemail: [x] Yes [] No    Indira Aly Rep   11/13/23 10:00 EST

## 2023-11-13 NOTE — ASSESSMENT & PLAN NOTE
I recommend that she continue with therapy, and we will schedule her with psychiatric medication management.

## 2023-11-13 NOTE — TELEPHONE ENCOUNTER
Patients daughter calling back about refill request, states she is concerned because her mother is completley out of medication. She also wanted to verify that those be [facility] to [facility] [address]

## 2023-11-13 NOTE — ASSESSMENT & PLAN NOTE
Diabetes status is unknown .   Continue current treatment regimen.  Diabetes will be reassessed in 3 months.

## 2023-11-14 ENCOUNTER — TELEPHONE (OUTPATIENT)
Dept: FAMILY MEDICINE CLINIC | Facility: CLINIC | Age: 68
End: 2023-11-14
Payer: MEDICARE

## 2023-11-14 NOTE — TELEPHONE ENCOUNTER
PT DAUGHTER CALLED TO CHECK STATUS FOR RX  metFORMIN (GLUCOPHAGE) 500 MG tablet  REFILL, STATED THAT PT HAS NOT RECEIVED RX, AND IS OUT OF RX.    PLEASE ADVISE.cALL BACK:[phone]    [facility] [id number] - [place] - [address] - [phone]  - [phone] FX

## 2023-11-14 NOTE — TELEPHONE ENCOUNTER
DENISSE RAMOS PATIENT     pt daughter called to say pt is out of her Metformin and her meds haven't been sent in from October appt. She reports pt sugar sully high and requesting for it to be sent in. Thanks!

## 2023-11-15 RX ORDER — PROPRANOLOL HYDROCHLORIDE 10 MG/1
10 TABLET ORAL 3 TIMES DAILY
Qty: 270 TABLET | Refills: 0 | Status: CANCELLED | OUTPATIENT
Start: 2023-11-15

## 2023-11-15 NOTE — TELEPHONE ENCOUNTER
Caller: SHAW CARRERA    Relationship: Emergency Contact    Best call back number:  [phone]     Requested Prescriptions:   Requested Prescriptions     Pending Prescriptions Disp Refills    propranolol (INDERAL) 10 MG tablet       Sig: Take 1 tablet by mouth 3 (Three) Times a Day.        Pharmacy where request should be sent: [facility] [id number] [place] [id number] [address] - [phone]  - [phone] FX     Last office visit with prescribing clinician: 10/27/2023   Last telemedicine visit with prescribing clinician: Visit date not found   Next office visit with prescribing clinician: 11/20/2023     Additional details provided by patient: JASS STATED THAT THE ABOVE RX WAS ORIGINALLY PRESCRIBED BY PROVIDER [facility]    Does the patient have less than a 3 day supply:  [] Yes  [x] No    Would you like a call back once the refill request has been completed: [] Yes [x] No    If the office needs to give you a call back, can they leave a voicemail: [] Yes [x] No    Indira Tubbs Rep   11/15/23 09:51 EST

## 2023-11-20 ENCOUNTER — OFFICE VISIT (OUTPATIENT)
Dept: FAMILY MEDICINE CLINIC | Facility: CLINIC | Age: 68
End: 2023-11-20
Payer: MEDICARE

## 2023-11-20 VITALS
BODY MASS INDEX: 23.73 KG/M2 | WEIGHT: 133.9 LBS | HEIGHT: 63 IN | DIASTOLIC BLOOD PRESSURE: 70 MMHG | HEART RATE: 75 BPM | OXYGEN SATURATION: 96 % | SYSTOLIC BLOOD PRESSURE: 100 MMHG

## 2023-11-20 DIAGNOSIS — R25.1 TREMORS OF NERVOUS SYSTEM: ICD-10-CM

## 2023-11-20 DIAGNOSIS — F31.9 BIPOLAR AFFECTIVE DISORDER, REMISSION STATUS UNSPECIFIED: ICD-10-CM

## 2023-11-20 DIAGNOSIS — I10 PRIMARY HYPERTENSION: ICD-10-CM

## 2023-11-20 DIAGNOSIS — E11.65 TYPE 2 DIABETES MELLITUS WITH HYPERGLYCEMIA, WITHOUT LONG-TERM CURRENT USE OF INSULIN: Primary | ICD-10-CM

## 2023-11-20 PROCEDURE — 99214 OFFICE O/P EST MOD 30 MIN: CPT | Performed by: PHYSICIAN ASSISTANT

## 2023-11-20 PROCEDURE — 3074F SYST BP LT 130 MM HG: CPT | Performed by: PHYSICIAN ASSISTANT

## 2023-11-20 PROCEDURE — 3052F HG A1C>EQUAL 8.0%<EQUAL 9.0%: CPT | Performed by: PHYSICIAN ASSISTANT

## 2023-11-20 PROCEDURE — 3078F DIAST BP <80 MM HG: CPT | Performed by: PHYSICIAN ASSISTANT

## 2023-11-20 RX ORDER — OLANZAPINE 5 MG/1
5 TABLET ORAL NIGHTLY
COMMUNITY
End: 2023-11-20 | Stop reason: SDUPTHER

## 2023-11-20 RX ORDER — BLOOD SUGAR DIAGNOSTIC
STRIP MISCELLANEOUS
Qty: 30 EACH | Refills: 12 | Status: SHIPPED | OUTPATIENT
Start: 2023-11-20

## 2023-11-20 RX ORDER — OLANZAPINE 5 MG/1
5 TABLET ORAL NIGHTLY
Qty: 90 TABLET | Refills: 0 | Status: SHIPPED | OUTPATIENT
Start: 2023-11-20

## 2023-11-20 RX ORDER — LANCETS
EACH MISCELLANEOUS
Qty: 100 EACH | Refills: 12 | Status: SHIPPED | OUTPATIENT
Start: 2023-11-20 | End: 2024-11-19

## 2023-11-20 RX ORDER — PROPRANOLOL HYDROCHLORIDE 10 MG/1
10 TABLET ORAL 2 TIMES DAILY
Qty: 180 TABLET | Refills: 0 | Status: SHIPPED | OUTPATIENT
Start: 2023-11-20

## 2023-11-20 NOTE — PROGRESS NOTES
Office Note     Name: Coni Steele    : 1955     MRN: [id number]     Chief Complaint  Diabetes and Hypertension    Subjective     History of Present Illness:  Coni Steele is a 68 y.o. female who presents today for eval diabetes and hypertension, which are chronic per patient history.  She denies any acute complaints at this time.  She states that her blood sugars have been running in the 200s with her metformin 500 mg twice daily and her Tradjenta 5 mg daily.    Her blood pressure is a little low today, but her daughter states that she does not think it has been running that way at home.  She denies any dizziness or recent falls.    Past Medical History:   Past Medical History:   Diagnosis Date    Anxiety     Depression     Diabetes mellitus     Hypertension        Past Surgical History:   Past Surgical History:   Procedure Laterality Date    APPENDECTOMY      CHOLECYSTECTOMY         Family History: History reviewed. No pertinent family history.    Social History:   Social History     Socioeconomic History    Marital status:    Tobacco Use    Smoking status: Never    Smokeless tobacco: Never   Vaping Use    Vaping Use: Never used   Substance and Sexual Activity    Alcohol use: Never    Drug use: Never    Sexual activity: Defer       Immunizations:   Immunization History   Administered Date(s) Administered    COVID-19 (PFIZER) Purple Cap Monovalent 2021, 2021, 2022    Flu Vaccine Quad PF 6-35MO 10/15/2019    Fluzone (or Fluarix & Flulaval for VFC) >6mos 2021    Tdap 2019        Medications:     Current Outpatient Medications:     glucose blood (Accu-Chek Whit Plus) test strip, Check blood sugar daily, Disp: 30 each, Rfl: 12    lamoTRIgine (LaMICtal) 150 MG tablet, Take 1 tablet by mouth 2 (Two) Times a Day., Disp: 60 tablet, Rfl: 2    linagliptin (Tradjenta) 5 MG tablet tablet, Take 1 tablet by mouth Daily., Disp: 30 tablet, Rfl: 2    lisinopril (PRINIVIL,ZESTRIL) 40  "MG tablet, Take 1 tablet by mouth Every Morning., Disp: 90 tablet, Rfl: 3    metFORMIN (GLUCOPHAGE) 500 MG tablet, Take 2 tablets by mouth 2 (Two) Times a Day With Meals., Disp: 360 tablet, Rfl: 0    multivitamin with minerals tablet tablet, Take 1 tablet by mouth Daily., Disp: , Rfl:     OLANZapine (zyPREXA) 5 MG tablet, Take 1 tablet by mouth Every Night., Disp: 90 tablet, Rfl: 0    ondansetron (ZOFRAN) 4 MG tablet, Take 1 tablet by mouth Every 8 (Eight) Hours As Needed. for nausea, Disp: , Rfl:     polycarbophil 625 MG tablet tablet, Take 1 tablet by mouth Daily As Needed., Disp: , Rfl:     propranolol (INDERAL) 10 MG tablet, Take 1 tablet by mouth 2 (Two) Times a Day., Disp: 180 tablet, Rfl: 0    Accu-Chek Softclix Lancets lancets, Use as instructed, Disp: 100 each, Rfl: 12    Allergies:   Allergies   Allergen Reactions    Phenothiazines Anaphylaxis    Shellfish-Derived Products Anaphylaxis    Phenylephrine Other (See Comments)     Patient does not know what her intolerance is    Indomethacin Unknown - Low Severity    Levofloxacin Other (See Comments)     'Felt like something was going to snap in the back of my leg'     Seroquel Xr [Quetiapine Fumarate Er] Unknown - Low Severity       Objective     Vital Signs  /70 (BP Location: Left arm, Patient Position: Sitting, Cuff Size: Adult)   Pulse 75   Ht 158.8 cm (62.5\")   Wt 60.7 kg (133 lb 14.4 oz)   SpO2 96%   BMI 24.10 kg/m²   Estimated body mass index is 24.1 kg/m² as calculated from the following:    Height as of this encounter: 158.8 cm (62.5\").    Weight as of this encounter: 60.7 kg (133 lb 14.4 oz).    BMI is within normal parameters. No other follow-up for BMI required.    Physical Exam  Vitals reviewed.   Constitutional:       General: She is not in acute distress.     Appearance: Normal appearance. She is not ill-appearing.   HENT:      Head: Normocephalic and atraumatic.   Cardiovascular:      Rate and Rhythm: Normal rate and regular rhythm. "      Pulses: Normal pulses.      Heart sounds: Normal heart sounds.   Pulmonary:      Effort: Pulmonary effort is normal. No respiratory distress.      Breath sounds: Normal breath sounds.   Neurological:      General: No focal deficit present.      Mental Status: She is alert and oriented to person, place, and time.   Psychiatric:         Mood and Affect: Mood normal.         Behavior: Behavior normal.       Assessment and Plan     Assessment/Plan:  Diagnoses and all orders for this visit:    1. Type 2 diabetes mellitus with hyperglycemia, without long-term current use of insulin (Primary)  Assessment & Plan:  Diabetes is chronic and not currently controlled.  I will increase her metformin to 2 tablets twice a day, and I recommend that she continue to monitor.    Orders:  -     metFORMIN (GLUCOPHAGE) 500 MG tablet; Take 2 tablets by mouth 2 (Two) Times a Day With Meals.  Dispense: 360 tablet; Refill: 0  -     glucose blood (Accu-Chek Whit Plus) test strip; Check blood sugar daily  Dispense: 30 each; Refill: 12  -     Accu-Chek Softclix Lancets lancets; Use as instructed  Dispense: 100 each; Refill: 12  -     linagliptin (Tradjenta) 5 MG tablet tablet; Take 1 tablet by mouth Daily.  Dispense: 30 tablet; Refill: 2    2. Primary hypertension  Assessment & Plan:  Hypertension is chronic.  Her levels are low on today's visit, so she will monitor at home.  I recommend decreasing her lisinopril to 20 mg if her levels are staying low.      3. Bipolar affective disorder, remission status unspecified  Assessment & Plan:  She has been doing well on current therapy, and she is establishing with a psychiatrist.    Orders:  -     OLANZapine (zyPREXA) 5 MG tablet; Take 1 tablet by mouth Every Night.  Dispense: 90 tablet; Refill: 0    4. Tremors of nervous system  Assessment & Plan:  This has not yet been improving her tremors, but they will continue to monitor.    Orders:  -     propranolol (INDERAL) 10 MG tablet; Take 1 tablet  by mouth 2 (Two) Times a Day.  Dispense: 180 tablet; Refill: 0        Follow Up  Return in about 4 weeks (around 12/18/2023) for next scheduled follow up.    Florence Gonsales PA-C  [facility] Internal Medicine [facility]

## 2023-11-22 ENCOUNTER — TELEPHONE (OUTPATIENT)
Dept: FAMILY MEDICINE CLINIC | Facility: CLINIC | Age: 68
End: 2023-11-22
Payer: MEDICARE

## 2023-11-22 NOTE — TELEPHONE ENCOUNTER
Incoming Refill Request      Medication requested (name and dose):   LAMOTRIGINE 150 MG    Pharmacy where request should be sent:   [facility]    Additional details provided by patient:   HAS ENOUGH UNTIL MONDAY, 11/27.    Best call back number: [phone]    Does the patient have less than a 3 day supply:  [] Yes  [x] No    Indira Redd Rep  11/22/23, 16:56 EST

## 2023-11-27 ENCOUNTER — TELEPHONE (OUTPATIENT)
Dept: FAMILY MEDICINE CLINIC | Facility: CLINIC | Age: 68
End: 2023-11-27
Payer: MEDICARE

## 2023-11-27 DIAGNOSIS — F31.70 BIPOLAR DISORDER IN FULL REMISSION, MOST RECENT EPISODE UNSPECIFIED TYPE: ICD-10-CM

## 2023-11-27 RX ORDER — LAMOTRIGINE 150 MG/1
150 TABLET ORAL 2 TIMES DAILY
Qty: 60 TABLET | Refills: 2 | Status: SHIPPED | OUTPATIENT
Start: 2023-11-27

## 2023-11-27 NOTE — ASSESSMENT & PLAN NOTE
Hypertension is chronic.  Her levels are low on today's visit, so she will monitor at home.  I recommend decreasing her lisinopril to 20 mg if her levels are staying low.

## 2023-11-27 NOTE — ASSESSMENT & PLAN NOTE
Diabetes is chronic and not currently controlled.  I will increase her metformin to 2 tablets twice a day, and I recommend that she continue to monitor.

## 2023-11-27 NOTE — TELEPHONE ENCOUNTER
Name: SHAW CARRERA    Relationship: Emergency Contact    Best Callback Number:  [phone]     HUB PROVIDED THE RELAY MESSAGE FROM THE OFFICE   PATIENT HAS FURTHER QUESTIONS AND WOULD LIKE A CALL BACK AT THE FOLLOWING PHONE NUMBER [phone]    ADDITIONAL INFORMATION:  WOULD THE PATIENT KEEP TAKING THE TRADJENTA ALONG WITH THE NEW PILL THAT WAS MENTIONED IN THE MESSAGE   SHAW WILL TALK WITH HER MOM AT LUNCH TIME AND SEE IF SHE WANTS TO STARTING TAKING A VITAMIN D PILL AND WOULD THAT BE CALLED IN AS A PRESCRIPTION    ALSO WILL TALK WITH HER ABOUT STARTING A CHOLESTEROL MEDICATION

## 2023-11-27 NOTE — TELEPHONE ENCOUNTER
----- Message from Florence Gonsales PA-C sent at 11/26/2023 11:00 PM EST -----  A1c is uncontrolled, which does correlate with the numbers she was getting on the last labs.  We did increase her metformin, but she would also benefit from the addition of another medication to her current treatment.  I would recommend a pill that will help eliminate excess sugar through urine, but it has increased risk for urinary tract or yeast infections.  It does have potential cardiovascular and kidney benefits, which are also needed due to her levels.    Her vitamin D is also low, so I would recommend that she start 5000 units a day.  We will repeat levels in 3 months.    Her cholesterol level was quite elevated.  I would recommend that she start a cholesterol medication if she is willing to try one.    Her thyroid screening was mildly abnormal, but the follow-up testing was in the normal range.  We will continue to monitor.    Vitamin B12 level, liver function, blood count, and electrolytes were all in the normal range.    HUB  CAN RELAY

## 2023-11-28 ENCOUNTER — TELEPHONE (OUTPATIENT)
Dept: FAMILY MEDICINE CLINIC | Facility: CLINIC | Age: 68
End: 2023-11-28
Payer: MEDICARE

## 2023-11-28 NOTE — TELEPHONE ENCOUNTER
Spoke with daughter yesterday and patient has tried rosuvastatin and did not like it daughter is wondering what the medication would be for diabetes?

## 2023-11-30 DIAGNOSIS — E11.65 TYPE 2 DIABETES MELLITUS WITH HYPERGLYCEMIA, WITHOUT LONG-TERM CURRENT USE OF INSULIN: Primary | ICD-10-CM

## 2023-12-02 NOTE — TELEPHONE ENCOUNTER
Daughter called back asking about the cholesterol medication. Patient has agreed to take it, but nothing has been sent in for her. Also, is there a difference in Vit D and Vit D3?

## 2023-12-04 DIAGNOSIS — E78.2 MIXED HYPERLIPIDEMIA: Primary | ICD-10-CM

## 2023-12-04 RX ORDER — ATORVASTATIN CALCIUM 20 MG/1
20 TABLET, FILM COATED ORAL DAILY
Qty: 90 TABLET | Refills: 1 | Status: SHIPPED | OUTPATIENT
Start: 2023-12-04

## 2023-12-13 ENCOUNTER — TELEPHONE (OUTPATIENT)
Dept: FAMILY MEDICINE CLINIC | Facility: CLINIC | Age: 68
End: 2023-12-13

## 2023-12-13 NOTE — TELEPHONE ENCOUNTER
Caller: SHAW CARRERA    Relationship: Emergency Contact    Best call back number: [phone]     What is the best time to reach you: ANYTIME, CAN LEAVE A DETAILED VOICE MESSAGE      Who are you requesting to speak with (clinical staff, provider,  specific staff member): CLINICAL STAFF    Do you know the name of the person who called: THE PATIENT'S DAUGHTER SHAW (ON  VER[employer])    What was the call regarding: THE PATIENT'S DAUGHTER SHAW IS REQUESTING A CALL BACK WITH THE PATIENT'S KNOWN ALLERGIES ON FILE SO SHE CAN SET THE PATIENT UP WITH [facility].     Is it okay if the provider responds through [employer]: N/A - PLEASE CALL

## 2023-12-18 ENCOUNTER — OFFICE VISIT (OUTPATIENT)
Dept: FAMILY MEDICINE CLINIC | Facility: CLINIC | Age: 68
End: 2023-12-18
Payer: MEDICARE

## 2023-12-18 VITALS
SYSTOLIC BLOOD PRESSURE: 120 MMHG | OXYGEN SATURATION: 98 % | DIASTOLIC BLOOD PRESSURE: 60 MMHG | WEIGHT: 131.1 LBS | HEART RATE: 106 BPM | HEIGHT: 63 IN | BODY MASS INDEX: 23.23 KG/M2

## 2023-12-18 DIAGNOSIS — E11.65 TYPE 2 DIABETES MELLITUS WITH HYPERGLYCEMIA, WITHOUT LONG-TERM CURRENT USE OF INSULIN: ICD-10-CM

## 2023-12-18 DIAGNOSIS — F31.5 BIPOLAR DISORDER, CURRENT EPISODE DEPRESSED, SEVERE, WITH PSYCHOTIC FEATURES: Primary | ICD-10-CM

## 2023-12-18 PROCEDURE — 3074F SYST BP LT 130 MM HG: CPT | Performed by: PHYSICIAN ASSISTANT

## 2023-12-18 PROCEDURE — 99214 OFFICE O/P EST MOD 30 MIN: CPT | Performed by: PHYSICIAN ASSISTANT

## 2023-12-18 PROCEDURE — 3052F HG A1C>EQUAL 8.0%<EQUAL 9.0%: CPT | Performed by: PHYSICIAN ASSISTANT

## 2023-12-18 PROCEDURE — 3078F DIAST BP <80 MM HG: CPT | Performed by: PHYSICIAN ASSISTANT

## 2023-12-18 RX ORDER — ESCITALOPRAM OXALATE 5 MG/1
5 TABLET ORAL DAILY
Qty: 30 TABLET | Refills: 1 | Status: SHIPPED | OUTPATIENT
Start: 2023-12-18

## 2023-12-18 NOTE — PROGRESS NOTES
"    Office Note     Name: Coni Steele    : 1955     MRN: [id number]     Chief Complaint  Diabetes    Subjective     History of Present Illness:  Coni Steele is a 68 y.o. female accompanied by her daughter who presents today for eval of delusions with history of bipolar diagnosis as well as diabetes, which are both chronic per patient history.  She and her daughter both admits that she has been more \"weepy\" lately, and she has also been very irritable.  She lives at [place], and she also feels that they have \"zapped her,\" and \"put their thoughts in her head.\"  She states that she does not have an appointment with the psychiatrist , and she needs to make some sort of adjustment before then.  She states that she has been on Lexapro in the past, and she felt like it was helpful for her depression and irritability.  Her daughter is in agreement with this.    She states that her blood sugars are also still running high.  She does not understand why they have several different in the last year when they would run around 110 previously.  She states that she has been taking her medications, but her daughter admits she thought she was supposed to stop the Tradjenta when she started the Jardiance.  She has been taking the Jardiance tolerating it well.  Her blood sugars have still been running 190-210 when she checks them.    Review of Systems:   Review of Systems   Psychiatric/Behavioral:  Positive for agitation, dysphoric mood, sleep disturbance and stress. Negative for hallucinations, self-injury and suicidal ideas. The patient is nervous/anxious.        Past Medical History:   Past Medical History:   Diagnosis Date    Anxiety     Depression     Diabetes mellitus     Hypertension        Past Surgical History:   Past Surgical History:   Procedure Laterality Date    APPENDECTOMY      CHOLECYSTECTOMY         Family History: History reviewed. No pertinent family history.    Social History:   Social History "     Socioeconomic History    Marital status:    Tobacco Use    Smoking status: Never    Smokeless tobacco: Never   Vaping Use    Vaping Use: Never used   Substance and Sexual Activity    Alcohol use: Never    Drug use: Never    Sexual activity: Defer       Immunizations:   Immunization History   Administered Date(s) Administered    COVID-19 (PFIZER) Purple Cap Monovalent 03/13/2021, 04/03/2021, 01/24/2022    Flu Vaccine Quad PF 6-35MO 10/15/2019    Fluzone (or Fluarix & Flulaval for VFC) >6mos 12/20/2021    Tdap 01/18/2019        Medications:     Current Outpatient Medications:     empagliflozin (JARDIANCE) 25 MG tablet tablet, Take 1 tablet by mouth Daily., Disp: 30 tablet, Rfl: 0    Accu-Chek Softclix Lancets lancets, Use as instructed, Disp: 100 each, Rfl: 12    atorvastatin (LIPITOR) 20 MG tablet, Take 1 tablet by mouth Daily., Disp: 90 tablet, Rfl: 1    escitalopram (LEXAPRO) 5 MG tablet, Take 1 tablet by mouth Daily., Disp: 30 tablet, Rfl: 1    glucose blood (Accu-Chek Whit Plus) test strip, Check blood sugar daily, Disp: 30 each, Rfl: 12    lamoTRIgine (LaMICtal) 150 MG tablet, Take 1 tablet by mouth 2 (Two) Times a Day., Disp: 60 tablet, Rfl: 2    lisinopril (PRINIVIL,ZESTRIL) 40 MG tablet, Take 1 tablet by mouth Every Morning., Disp: 90 tablet, Rfl: 3    metFORMIN (GLUCOPHAGE) 500 MG tablet, Take 2 tablets by mouth 2 (Two) Times a Day With Meals., Disp: 360 tablet, Rfl: 0    multivitamin with minerals tablet tablet, Take 1 tablet by mouth Daily., Disp: , Rfl:     OLANZapine (zyPREXA) 5 MG tablet, Take 1 tablet by mouth Every Night., Disp: 90 tablet, Rfl: 0    ondansetron (ZOFRAN) 4 MG tablet, Take 1 tablet by mouth Every 8 (Eight) Hours As Needed. for nausea, Disp: , Rfl:     polycarbophil 625 MG tablet tablet, Take 1 tablet by mouth Daily As Needed., Disp: , Rfl:     propranolol (INDERAL) 10 MG tablet, Take 1 tablet by mouth 2 (Two) Times a Day., Disp: 180 tablet, Rfl: 0    Allergies:   Allergies  "  Allergen Reactions    Phenothiazines Anaphylaxis    Shellfish-Derived Products Anaphylaxis    Phenylephrine Other (See Comments)     Patient does not know what her intolerance is    Indomethacin Unknown - Low Severity    Levofloxacin Other (See Comments)     'Felt like something was going to snap in the back of my leg'     Seroquel Xr [Quetiapine Fumarate Er] Unknown - Low Severity       Objective     Vital Signs  /60 (BP Location: Left arm, Patient Position: Sitting, Cuff Size: Adult)   Pulse 106   Ht 158.8 cm (62.5\")   Wt 59.5 kg (131 lb 1.6 oz)   SpO2 98%   BMI 23.60 kg/m²   Estimated body mass index is 23.6 kg/m² as calculated from the following:    Height as of this encounter: 158.8 cm (62.5\").    Weight as of this encounter: 59.5 kg (131 lb 1.6 oz).    BMI is within normal parameters. No other follow-up for BMI required.      Physical Exam  Vitals reviewed.   Constitutional:       General: She is not in acute distress.     Appearance: Normal appearance. She is not ill-appearing.   HENT:      Head: Normocephalic and atraumatic.   Cardiovascular:      Rate and Rhythm: Normal rate and regular rhythm.      Pulses: Normal pulses.      Heart sounds: Normal heart sounds.   Pulmonary:      Effort: Pulmonary effort is normal. No respiratory distress.      Breath sounds: Normal breath sounds.   Neurological:      General: No focal deficit present.      Mental Status: She is alert and oriented to person, place, and time.   Psychiatric:         Mood and Affect: Mood normal.         Behavior: Behavior normal.         Assessment and Plan     Assessment/Plan:  Diagnoses and all orders for this visit:    1. Bipolar disorder, current episode depressed, severe, with psychotic features (Primary)  Assessment & Plan:  Her bipolar disorder is chronic and not currently controlled.  I advised and try adding back the Lexapro at a low dose, but she really needs to see behavioral health.  She will likely need adjustments of " her antipsychotic medications.  We will contact them to try to get an earlier appointment, at least with the counselor.  She and her daughter are in agreement.    Orders:  -     escitalopram (LEXAPRO) 5 MG tablet; Take 1 tablet by mouth Daily.  Dispense: 30 tablet; Refill: 1    2. Type 2 diabetes mellitus with hyperglycemia, without long-term current use of insulin  Assessment & Plan:  Diabetes is chronic and not currently controlled.  Her levels have elevated despite adjustments in medications.  However, she has not been taking the Tradjenta due to confusion.  I will increase her Jardiance to 25 mg, and then we can resume Tradjenta if needed.  Her daughter expressed understanding.    Orders:  -     empagliflozin (JARDIANCE) 25 MG tablet tablet; Take 1 tablet by mouth Daily.  Dispense: 30 tablet; Refill: 0        Follow Up  Return in about 2 months (around 2/18/2024) for next scheduled follow up.    Florence Gonsales PA-C  [facility] Internal Medicine [facility]

## 2023-12-19 ENCOUNTER — TELEPHONE (OUTPATIENT)
Dept: FAMILY MEDICINE CLINIC | Facility: CLINIC | Age: 68
End: 2023-12-19
Payer: MEDICARE

## 2023-12-19 NOTE — ASSESSMENT & PLAN NOTE
Diabetes is chronic and not currently controlled.  Her levels have elevated despite adjustments in medications.  However, she has not been taking the Tradjenta due to confusion.  I will increase her Jardiance to 25 mg, and then we can resume Tradjenta if needed.  Her daughter expressed understanding.

## 2023-12-19 NOTE — TELEPHONE ENCOUNTER
[facility] called needing verbal order to use med list daughter sent to them for 30 days.

## 2023-12-19 NOTE — ASSESSMENT & PLAN NOTE
Her bipolar disorder is chronic and not currently controlled.  I advised and try adding back the Lexapro at a low dose, but she really needs to see behavioral health.  She will likely need adjustments of her antipsychotic medications.  We will contact them to try to get an earlier appointment, at least with the counselor.  She and her daughter are in agreement.

## 2024-01-02 ENCOUNTER — TELEPHONE (OUTPATIENT)
Dept: FAMILY MEDICINE CLINIC | Facility: CLINIC | Age: 69
End: 2024-01-02
Payer: MEDICARE

## 2024-01-02 NOTE — TELEPHONE ENCOUNTER
Caller: SHAW CARRERA    Relationship: Emergency Contact    Best call back number:  [phone]     Requested Prescriptions:   ACCU CHECK GUIDE GLUCOSE SYSTEM/MONITOR, TEST STRIPS, AND LANCETS.    [address] [facility] [phone] [facility] [phone] [facility]     Last office visit with prescribing clinician: 12/18/2023   Last telemedicine visit with prescribing clinician: Visit date not found   Next office visit with prescribing clinician: 1/18/2024     Additional details provided by patient: PATIENTS MACHINE BROKE AND PHARMACY ADVISED NEEDS A NEW RX FOR IT ALL. PLEASE MAKE SURE THE TEST STRIPS AND LANCETS WORK WITH NEW METER.    Does the patient have less than a 3 day supply:  [x] Yes  [] No    Would you like a call back once the refill request has been completed: [x] Yes [] No    If the office needs to give you a call back, can they leave a voicemail: [x] Yes [] No    Indira Tubbs Rep   01/02/24 15:58 EST

## 2024-01-04 DIAGNOSIS — E11.65 TYPE 2 DIABETES MELLITUS WITH HYPERGLYCEMIA, WITHOUT LONG-TERM CURRENT USE OF INSULIN: Primary | ICD-10-CM

## 2024-01-04 NOTE — TELEPHONE ENCOUNTER
THE PATIENT CALLED AND SATED THAT [facility] HAS NOT RECEIVED A REQUEST FOR THE SUPPLIES LISTED.    PLEASE CALL THE PATIENT TO DISCUSS  FOR SHAW.

## 2024-01-11 ENCOUNTER — OFFICE VISIT (OUTPATIENT)
Dept: FAMILY MEDICINE CLINIC | Facility: CLINIC | Age: 69
End: 2024-01-11
Payer: MEDICARE

## 2024-01-11 VITALS
HEIGHT: 63 IN | HEART RATE: 89 BPM | WEIGHT: 133.8 LBS | SYSTOLIC BLOOD PRESSURE: 110 MMHG | DIASTOLIC BLOOD PRESSURE: 60 MMHG | BODY MASS INDEX: 23.71 KG/M2 | OXYGEN SATURATION: 96 %

## 2024-01-11 DIAGNOSIS — R25.1 TREMORS OF NERVOUS SYSTEM: ICD-10-CM

## 2024-01-11 DIAGNOSIS — E11.65 TYPE 2 DIABETES MELLITUS WITH HYPERGLYCEMIA, WITHOUT LONG-TERM CURRENT USE OF INSULIN: Primary | ICD-10-CM

## 2024-01-11 PROCEDURE — 3074F SYST BP LT 130 MM HG: CPT | Performed by: PHYSICIAN ASSISTANT

## 2024-01-11 PROCEDURE — 99214 OFFICE O/P EST MOD 30 MIN: CPT | Performed by: PHYSICIAN ASSISTANT

## 2024-01-11 PROCEDURE — 3078F DIAST BP <80 MM HG: CPT | Performed by: PHYSICIAN ASSISTANT

## 2024-01-11 NOTE — PROGRESS NOTES
Office Note     Name: Coni Steele    : 1955     MRN: [id number]     Chief Complaint  hands shaking    Subjective     History of Present Illness:  Coni Steele is a 68 y.o. female accompanied by her daughter who presents today for eval of uncontrolled diabetes.  Her blood sugar has been in the high 200s and low 300s on her current medications.  They also got a new monitor to ensure that her levels are accurate.    She also states that her tremors are getting worse despite the propranolol.  She states that it is making it difficult for her to eat and drink.    Past Medical History:   Past Medical History:   Diagnosis Date    Anxiety     Depression     Diabetes mellitus     Hypertension        Past Surgical History:   Past Surgical History:   Procedure Laterality Date    APPENDECTOMY      CHOLECYSTECTOMY         Family History: History reviewed. No pertinent family history.    Social History:   Social History     Socioeconomic History    Marital status:    Tobacco Use    Smoking status: Never    Smokeless tobacco: Never   Vaping Use    Vaping Use: Never used   Substance and Sexual Activity    Alcohol use: Never    Drug use: Never    Sexual activity: Defer       Immunizations:   Immunization History   Administered Date(s) Administered    COVID-19 (PFIZER) Purple Cap Monovalent 2021, 2021, 2022    Flu Vaccine Quad PF 6-35MO 10/15/2019    Fluzone (or Fluarix & Flulaval for VFC) >6mos 2021    Tdap 2019        Medications:     Current Outpatient Medications:     Accu-Chek Softclix Lancets lancets, Use as instructed, Disp: 100 each, Rfl: 12    atorvastatin (LIPITOR) 20 MG tablet, Take 1 tablet by mouth Daily., Disp: 90 tablet, Rfl: 1    empagliflozin (JARDIANCE) 25 MG tablet tablet, Take 1 tablet by mouth Daily., Disp: 30 tablet, Rfl: 0    escitalopram (LEXAPRO) 5 MG tablet, Take 1 tablet by mouth Daily., Disp: 30 tablet, Rfl: 1    glucose blood (Accu-Chek Whit Plus) test  "strip, Check blood sugar daily, Disp: 30 each, Rfl: 12    lamoTRIgine (LaMICtal) 150 MG tablet, Take 1 tablet by mouth 2 (Two) Times a Day., Disp: 60 tablet, Rfl: 2    lisinopril (PRINIVIL,ZESTRIL) 40 MG tablet, Take 1 tablet by mouth Every Morning., Disp: 90 tablet, Rfl: 3    metFORMIN (GLUCOPHAGE) 500 MG tablet, Take 2 tablets by mouth 2 (Two) Times a Day With Meals., Disp: 360 tablet, Rfl: 0    multivitamin with minerals tablet tablet, Take 1 tablet by mouth Daily., Disp: , Rfl:     OLANZapine (zyPREXA) 5 MG tablet, Take 1 tablet by mouth Every Night., Disp: 90 tablet, Rfl: 0    ondansetron (ZOFRAN) 4 MG tablet, Take 1 tablet by mouth Every 8 (Eight) Hours As Needed. for nausea, Disp: , Rfl:     polycarbophil 625 MG tablet tablet, Take 1 tablet by mouth Daily As Needed., Disp: , Rfl:     SITagliptin (Januvia) 100 MG tablet, Take 1 tablet by mouth Daily., Disp: 30 tablet, Rfl: 2    Allergies:   Allergies   Allergen Reactions    Phenothiazines Anaphylaxis    Shellfish-Derived Products Anaphylaxis    Phenylephrine Other (See Comments)     Patient does not know what her intolerance is    Indomethacin Unknown - Low Severity    Levofloxacin Other (See Comments)     'Felt like something was going to snap in the back of my leg'     Seroquel Xr [Quetiapine Fumarate Er] Unknown - Low Severity       Objective     Vital Signs  /60 (BP Location: Left arm, Patient Position: Sitting, Cuff Size: Adult)   Pulse 89   Ht 158.8 cm (62.5\")   Wt 60.7 kg (133 lb 12.8 oz)   SpO2 96%   BMI 24.08 kg/m²   Estimated body mass index is 24.08 kg/m² as calculated from the following:    Height as of this encounter: 158.8 cm (62.5\").    Weight as of this encounter: 60.7 kg (133 lb 12.8 oz).    BMI is within normal parameters. No other follow-up for BMI required.      Physical Exam  Vitals reviewed.   Constitutional:       General: She is not in acute distress.     Appearance: Normal appearance. She is not ill-appearing.   HENT:      " Head: Normocephalic and atraumatic.   Cardiovascular:      Rate and Rhythm: Normal rate and regular rhythm.      Pulses: Normal pulses.      Heart sounds: Normal heart sounds.   Pulmonary:      Effort: Pulmonary effort is normal. No respiratory distress.      Breath sounds: Normal breath sounds.   Neurological:      General: No focal deficit present.      Mental Status: She is alert and oriented to person, place, and time.      Comments: Tremor at rest, which improves with intention; no cogwheeling appreciated   Psychiatric:         Mood and Affect: Mood normal.         Behavior: Behavior normal.        Assessment and Plan     Assessment/Plan:  Diagnoses and all orders for this visit:    1. Type 2 diabetes mellitus with hyperglycemia, without long-term current use of insulin (Primary)  Assessment & Plan:  Diabetes is chronic and currently uncontrolled.  She does not want to do any sort of injection, so we will add Januvia.  I prefer to avoid sulfonylureas due to risk of hypoglycemia with her age.  We also discussed the importance of improving diet.  I recommend that she keep the appointment with Endo when it arrives.    Orders:  -     SITagliptin (Januvia) 100 MG tablet; Take 1 tablet by mouth Daily.  Dispense: 30 tablet; Refill: 2    2. Tremors of nervous system  Assessment & Plan:  Her tremors are not improving with the propranolol.  I recommend neurology evaluation, and she is in agreement.    There is also a reasonable possibility that her tremors are due to her psychiatric medications.  She has been more stable on those medications recently, so they are hesitant to make adjustments.  She was unable to get an appointment with behavioral health before 2/26/2024.  We will try to see if there is any way she can get in earlier.          Follow Up  Return for next scheduled follow up.    Florence Gonsales PA-C  [facility]

## 2024-01-15 ENCOUNTER — OFFICE VISIT (OUTPATIENT)
Dept: BEHAVIORAL HEALTH | Facility: CLINIC | Age: 69
End: 2024-01-15
Payer: MEDICARE

## 2024-01-15 VITALS
WEIGHT: 135 LBS | DIASTOLIC BLOOD PRESSURE: 70 MMHG | OXYGEN SATURATION: 98 % | HEART RATE: 65 BPM | HEIGHT: 63 IN | BODY MASS INDEX: 23.92 KG/M2 | SYSTOLIC BLOOD PRESSURE: 118 MMHG

## 2024-01-15 DIAGNOSIS — F43.10 POST TRAUMATIC STRESS DISORDER (PTSD): ICD-10-CM

## 2024-01-15 DIAGNOSIS — F41.1 GENERALIZED ANXIETY DISORDER: ICD-10-CM

## 2024-01-15 DIAGNOSIS — F31.5 BIPOLAR DISORDER, CURRENT EPISODE DEPRESSED, SEVERE, WITH PSYCHOTIC FEATURES: Primary | ICD-10-CM

## 2024-01-15 PROCEDURE — 1160F RVW MEDS BY RX/DR IN RCRD: CPT | Performed by: NURSE PRACTITIONER

## 2024-01-15 PROCEDURE — 1159F MED LIST DOCD IN RCRD: CPT | Performed by: NURSE PRACTITIONER

## 2024-01-15 PROCEDURE — 3078F DIAST BP <80 MM HG: CPT | Performed by: NURSE PRACTITIONER

## 2024-01-15 PROCEDURE — 3074F SYST BP LT 130 MM HG: CPT | Performed by: NURSE PRACTITIONER

## 2024-01-15 PROCEDURE — 90792 PSYCH DIAG EVAL W/MED SRVCS: CPT | Performed by: NURSE PRACTITIONER

## 2024-01-15 RX ORDER — ESCITALOPRAM OXALATE 5 MG/1
5 TABLET ORAL DAILY
Qty: 30 TABLET | Refills: 1 | Status: SHIPPED | OUTPATIENT
Start: 2024-01-15

## 2024-01-15 RX ORDER — BREXPIPRAZOLE 1 MG/1
1 TABLET ORAL DAILY
Qty: 30 TABLET | Refills: 1 | Status: SHIPPED | OUTPATIENT
Start: 2024-01-15

## 2024-01-15 RX ORDER — LAMOTRIGINE 150 MG/1
150 TABLET ORAL 2 TIMES DAILY
Qty: 60 TABLET | Refills: 1 | Status: SHIPPED | OUTPATIENT
Start: 2024-01-15

## 2024-01-15 NOTE — ASSESSMENT & PLAN NOTE
Her tremors are not improving with the propranolol.  I recommend neurology evaluation, and she is in agreement.    There is also a reasonable possibility that her tremors are due to her psychiatric medications.  She has been more stable on those medications recently, so they are hesitant to make adjustments.  She was unable to get an appointment with behavioral health before 2/26/2024.  We will try to see if there is any way she can get in earlier.

## 2024-01-15 NOTE — ASSESSMENT & PLAN NOTE
Diabetes is chronic and currently uncontrolled.  She does not want to do any sort of injection, so we will add Januvia.  I prefer to avoid sulfonylureas due to risk of hypoglycemia with her age.  We also discussed the importance of improving diet.  I recommend that she keep the appointment with Endo when it arrives.

## 2024-01-15 NOTE — PROGRESS NOTES
New Patient Office Visit      Patient Name: Coni Steele  : 1955   MRN: [id number]     Referring Provider: Florence Gonsales PA-C    Chief Complaint:      ICD-10-CM ICD-9-CM   1. Bipolar disorder, current episode depressed, severe, with psychotic features  F31.5 296.54   2. Generalized anxiety disorder  F41.1 300.02   3. Post traumatic stress disorder (PTSD)  F43.10 309.81        History of Present Illness:   Coni Steele is a 68 y.o. female who is here today with her daughter for psychiatric medication adjustments.    Medications: olanzapine, lexapro, lamotrigine  Therapy: none currently-would like to get started    Subjective     Daughter reports:   [facility]  Was [facility] for 2 weeks-23 to 10/11/23  Was in [facility] for a while, did not like it there.  Has meds put in blister packs at [facility] for proper dosing, she could not manage it well.  Started lexapro about 2 weeks, feeling much better.    Having trouble with the olanzapine causing weight gain, trouble regulating sugar, A1C increased dramatically.    Patient reports: Living in [place] now, fabulous, so much better.  The lexapro is really starting to make me feel better, more energy, in a better mood.      Review of Systems:   Review of Systems   Psychiatric/Behavioral:  Positive for decreased concentration and stress. The patient is nervous/anxious.        Past Medical History:   Past Medical History:   Diagnosis Date    Anxiety     Depression     Diabetes mellitus     Hypertension        Past Surgical History:   Past Surgical History:   Procedure Laterality Date    APPENDECTOMY      CHOLECYSTECTOMY         Family History:   History reviewed. No pertinent family history.    Screening Scores:   PHQ-9 : 7  BRIT-7 : 4  MDQ: negative      Medications:     Current Outpatient Medications:     Accu-Chek Softclix Lancets lancets, Use as instructed, Disp: 100 each,  "Rfl: 12    atorvastatin (LIPITOR) 20 MG tablet, Take 1 tablet by mouth Daily., Disp: 90 tablet, Rfl: 1    empagliflozin (JARDIANCE) 25 MG tablet tablet, Take 1 tablet by mouth Daily., Disp: 30 tablet, Rfl: 0    escitalopram (LEXAPRO) 5 MG tablet, Take 1 tablet by mouth Daily., Disp: 30 tablet, Rfl: 1    glucose blood (Accu-Chek Whit Plus) test strip, Check blood sugar daily, Disp: 30 each, Rfl: 12    lamoTRIgine (LaMICtal) 150 MG tablet, Take 1 tablet by mouth 2 (Two) Times a Day., Disp: 60 tablet, Rfl: 1    lisinopril (PRINIVIL,ZESTRIL) 40 MG tablet, Take 1 tablet by mouth Every Morning., Disp: 90 tablet, Rfl: 3    metFORMIN (GLUCOPHAGE) 500 MG tablet, Take 2 tablets by mouth 2 (Two) Times a Day With Meals., Disp: 360 tablet, Rfl: 0    multivitamin with minerals tablet tablet, Take 1 tablet by mouth Daily., Disp: , Rfl:     ondansetron (ZOFRAN) 4 MG tablet, Take 1 tablet by mouth Every 8 (Eight) Hours As Needed. for nausea, Disp: , Rfl:     SITagliptin (Januvia) 100 MG tablet, Take 1 tablet by mouth Daily., Disp: 30 tablet, Rfl: 2    Brexpiprazole (Rexulti) 1 MG tablet, Take 1 mg by mouth Daily., Disp: 30 tablet, Rfl: 1    polycarbophil 625 MG tablet tablet, Take 1 tablet by mouth Daily As Needed. (Patient not taking: Reported on 1/15/2024), Disp: , Rfl:     Medication Considerations:  BESSY reviewed and appropriate.      Allergies:   Allergies   Allergen Reactions    Phenothiazines Anaphylaxis    Shellfish-Derived Products Anaphylaxis    Phenylephrine Other (See Comments)     Patient does not know what her intolerance is    Indomethacin Unknown - Low Severity    Levofloxacin Other (See Comments)     'Felt like something was going to snap in the back of my leg'     Seroquel Xr [Quetiapine Fumarate Er] Unknown - Low Severity       Objective     Physical Exam:  Vital Signs:   Vitals:    01/15/24 1406   BP: 118/70   Pulse: 65   SpO2: 98%   Weight: 61.2 kg (135 lb)   Height: 158.8 cm (62.5\")     Body mass index is " 24.3 kg/m².     Mental Status Exam:   Hygiene:   good  Cooperation:  Cooperative  Eye Contact:  Good  Psychomotor Behavior:  Appropriate  Affect:  Appropriate  Mood: normal  Speech:  Normal  Thought Process:  Linear  Thought Content:  Normal  Suicidal:  None  Homicidal:  None  Hallucinations:  None  Delusion:  Other nothing observable today  Memory:  Deficits  Orientation:  Grossly intact  Reliability:  fair  Insight:  Fair  Judgement:  Fair  Impulse Control:  Fair  Physical/Medical Issues:   mulitple in chart      Assessment / Plan      Visit Diagnosis/Orders Placed This Visit:  Diagnoses and all orders for this visit:    1. Bipolar disorder, current episode depressed, severe, with psychotic features (Primary)  -     escitalopram (LEXAPRO) 5 MG tablet; Take 1 tablet by mouth Daily.  Dispense: 30 tablet; Refill: 1  -     lamoTRIgine (LaMICtal) 150 MG tablet; Take 1 tablet by mouth 2 (Two) Times a Day.  Dispense: 60 tablet; Refill: 1  -     Brexpiprazole (Rexulti) 1 MG tablet; Take 1 mg by mouth Daily.  Dispense: 30 tablet; Refill: 1    2. Generalized anxiety disorder    3. Post traumatic stress disorder (PTSD)         Functional Status: Mild impairment     Prognosis: Guarded with Ongoing Treatment    Impression/Formulation:  Patient appeared alert and oriented.  Patient is voluntarily requesting to begin psychiatric medication management at [facility].  Patient is receptive to assistance with maintaining a stable lifestyle.  Patient presents with history of     ICD-10-CM ICD-9-CM   1. Bipolar disorder, current episode depressed, severe, with psychotic features  F31.5 296.54   2. Generalized anxiety disorder  F41.1 300.02   3. Post traumatic stress disorder (PTSD)  F43.10 309.81   Reviewed most recent labs from November  Metabolics have become a significant issue with start of olanzapine-was helpful when patient was in a crisis state and had to be hospitalized.  Trial rexulti as replacement  for olanzapine due to geriatric indication.  Depression and anxiety have improved with lexapro-started 2 weeks ago per daughter.  Patient referred to neurology by PCP to assess hand tremors    Treatment Plan:     0.5/1mg Rexulti starter pack-[id number] Exp. 02/24  Continue lexapro, lamotrigine  Wean off olanzapine with home supply.  Continue 5000iu Vitamin D3-OTC  Referred to Renata Han for therapy.    Patient will continue supportive psychotherapy efforts and medications as indicated. Clinic will obtain release of information for current treatment team for continuity of care as needed. Patient will contact this office, call 911 or present to the nearest emergency room should suicidal or homicidal ideations occur. Discussed medication options and treatment plan of prescribed medication(s) as well as the risks, benefits, and potential side effects. Patient ackowledged and verbally consented to continue with current treatment plan and was educated on the importance of compliance with treatment and follow-up appointments.     Follow Up:   Return in about 8 weeks (around 3/11/2024) for Med Check.        MARY JO Dukes, ALYSAP-BC  [facility]     This is electronically signed by MARY JO Dukes, ARISTIDES-BC  [unfilled] 17:33 EST

## 2024-01-22 ENCOUNTER — TELEPHONE (OUTPATIENT)
Dept: BEHAVIORAL HEALTH | Facility: CLINIC | Age: 69
End: 2024-01-22
Payer: MEDICARE

## 2024-01-22 NOTE — TELEPHONE ENCOUNTER
PATIENTS DAUGHTER CALLED, STATES THE REXULTI IS $450 AT THE PHARMACY.     SHE WANTS TO KNOW IF YOU WANT PATIENT TO STAY ON THE REXULTI     IF SO, CAN SHE CONTINUE TO GET SAMPLES FROM OFFICE

## 2024-01-22 NOTE — TELEPHONE ENCOUNTER
Pts daughter called and left a message stating that the Rexulti is over $400. They are wanting a different medication.

## 2024-01-23 ENCOUNTER — TELEPHONE (OUTPATIENT)
Dept: BEHAVIORAL HEALTH | Facility: CLINIC | Age: 69
End: 2024-01-23
Payer: MEDICARE

## 2024-01-23 DIAGNOSIS — F51.05 INSOMNIA DUE TO OTHER MENTAL DISORDER: Primary | ICD-10-CM

## 2024-01-23 DIAGNOSIS — F99 INSOMNIA DUE TO OTHER MENTAL DISORDER: Primary | ICD-10-CM

## 2024-01-23 RX ORDER — HYDROXYZINE HYDROCHLORIDE 10 MG/1
10 TABLET, FILM COATED ORAL NIGHTLY PRN
Qty: 30 TABLET | Refills: 1 | Status: SHIPPED | OUTPATIENT
Start: 2024-01-23

## 2024-01-23 NOTE — TELEPHONE ENCOUNTER
Patient called and left message stating that she is not sleeping at night and wanted to let you know that. Patient requesting something to help her sleep better.

## 2024-01-29 ENCOUNTER — TELEPHONE (OUTPATIENT)
Dept: FAMILY MEDICINE CLINIC | Facility: CLINIC | Age: 69
End: 2024-01-29

## 2024-01-29 DIAGNOSIS — R26.89 NEED FOR ASSISTANCE DUE TO UNSTEADY GAIT: ICD-10-CM

## 2024-01-29 DIAGNOSIS — R25.1 TREMORS OF NERVOUS SYSTEM: Primary | ICD-10-CM

## 2024-01-29 DIAGNOSIS — R29.6 FREQUENT FALLS: ICD-10-CM

## 2024-01-29 NOTE — TELEPHONE ENCOUNTER
Caller: SHAW CARRERA    Relationship: Emergency Contact    Best call back number: [phone]     What is the best time to reach you: ANY    Who are you requesting to speak with (clinical staff, provider,  specific staff member): DENISSE RAMOS OR HER NURSE    What was the call regarding: THE PATIENT'S DAUGHTER IS WANTING TO CHECK ON IF A REFERRAL TO NEUROLOGY WILL BE DONE. SHE IS STILL VERY SHAKY. THEY WERE ABLE TO SEE IDALMIS RICHARD ON 01.15.24. SHE DID DO A MEDICINE ADJUSTMENT. SHE WAS TAKEN OF OF THE OLANZAPINE AND PUT ON THE REXULTI. IT IS VERY EXPENSIVE SO RIGHT NOW SHE IS GIVING HER SAMPLES AND THEY WILL REVIEW AGAIN IN MARCH. THEY STILL WOULD LIKE TO FOLLOW UP ABOUT WHAT TYPE OF TESTING NEEDS TO BE DONE AND IDALMIS GLENNAONES AGREES. PLEASE GO AHEAD WITH THIS REFERRAL.     ALSO THE PATIENT'S DAUGHTER SAID SHE HAS GOTTEN SEVERAL CALLS SAYING THAT IT IS REALLY BAD. SHE IS REALLY SHAKY AND WOBBLY. SHE IS WANTING A WALKER AND AND EMERGENCY ALERT BRACELET FOR IF SHE FALLS. SHE SPOKE TO THE INSURANCE AND THEY WILL COVER A BASIC MODEL. CAN ORDERS FOR THESE BE DONE. PLEASE CALL SHAW BACK TO LET HER KNOW.     Is it okay if the provider responds through [employer]: NO

## 2024-01-31 ENCOUNTER — TELEPHONE (OUTPATIENT)
Dept: FAMILY MEDICINE CLINIC | Facility: CLINIC | Age: 69
End: 2024-01-31
Payer: MEDICARE

## 2024-01-31 NOTE — TELEPHONE ENCOUNTER
Heike from [facility] called and said that this pt told her they should have received a fax from us for a walker. Heike said they have not received it and asked if we could send that order again. Fax number is [phone].

## 2024-02-01 NOTE — TELEPHONE ENCOUNTER
Caller: ELIO CARRERAHER    Relationship: Emergency Contact    Best call back number: [phone]     What was the call regarding:   PATIENT'S (DAUGHTER) SHAW STATED THAT SHE WAS INFORMED BY [facility] TODAY 02/01/2024 THAT THE FORM CAN NOT BE COVED BY INSURANCE DUE TO THE FORM STATING THAT THIS WAS FOR A FOLDING WALKER AND FOR INSURANCE TO COVER THIS NEEDS TO BE CHANGED TO TO ROLLING WALKER OR ROLLING WALKER WITH SEAT

## 2024-02-04 DIAGNOSIS — R26.89 NEED FOR ASSISTANCE DUE TO UNSTEADY GAIT: ICD-10-CM

## 2024-02-04 DIAGNOSIS — R29.6 FREQUENT FALLS: Primary | ICD-10-CM

## 2024-02-06 ENCOUNTER — OFFICE VISIT (OUTPATIENT)
Dept: NEUROLOGY | Facility: CLINIC | Age: 69
End: 2024-02-06
Payer: MEDICARE

## 2024-02-06 VITALS
HEIGHT: 63 IN | BODY MASS INDEX: 23.74 KG/M2 | WEIGHT: 134 LBS | DIASTOLIC BLOOD PRESSURE: 58 MMHG | SYSTOLIC BLOOD PRESSURE: 116 MMHG | OXYGEN SATURATION: 97 % | HEART RATE: 66 BPM

## 2024-02-06 DIAGNOSIS — R51.9 NONINTRACTABLE HEADACHE, UNSPECIFIED CHRONICITY PATTERN, UNSPECIFIED HEADACHE TYPE: ICD-10-CM

## 2024-02-06 DIAGNOSIS — R25.1 TREMOR: Primary | ICD-10-CM

## 2024-02-06 PROBLEM — R53.1 GENERALIZED WEAKNESS: Status: ACTIVE | Noted: 2024-01-30

## 2024-02-06 PROBLEM — F31.9 BIPOLAR 1 DISORDER: Status: ACTIVE | Noted: 2017-03-11

## 2024-02-06 PROCEDURE — 99214 OFFICE O/P EST MOD 30 MIN: CPT | Performed by: NURSE PRACTITIONER

## 2024-02-06 PROCEDURE — 3074F SYST BP LT 130 MM HG: CPT | Performed by: NURSE PRACTITIONER

## 2024-02-06 PROCEDURE — 3078F DIAST BP <80 MM HG: CPT | Performed by: NURSE PRACTITIONER

## 2024-02-06 PROCEDURE — 1159F MED LIST DOCD IN RCRD: CPT | Performed by: NURSE PRACTITIONER

## 2024-02-06 PROCEDURE — 1160F RVW MEDS BY RX/DR IN RCRD: CPT | Performed by: NURSE PRACTITIONER

## 2024-02-06 RX ORDER — BLOOD-GLUCOSE METER
EACH MISCELLANEOUS
COMMUNITY
Start: 2024-01-05

## 2024-02-06 RX ORDER — PRIMIDONE 50 MG/1
TABLET ORAL
Qty: 120 TABLET | Refills: 5 | Status: SHIPPED | OUTPATIENT
Start: 2024-02-06

## 2024-02-06 NOTE — LETTER
2024     Florence Gonsales PA-C  [address]  [facility] [address]    Patient: Coni Steele   YOB: 1955   Date of Visit: 2024     Dear Florence Gonsales PA-C:       Thank you for referring Coni Steele to me for evaluation. Below are the relevant portions of my assessment and plan of care.    If you have questions, please do not hesitate to call me. I look forward to following Coni along with you.         Sincerely,        Priti Estrada DNP, APRN        CC: No Recipients    Priti Estrada DNP, APRN  [id number]  Signed     Neuro Office Visit      Encounter Date: 2024   Patient Name: Coni Steele  : 1955   MRN: [id number]   PCP: Florence Moran  Chief Complaint:    Chief Complaint   Patient presents with   • Tremors       History of Present Illness: Coni Steele is a 68 y.o. female who is here today in Neurology for  tremor. She is here with her daughter. She is disabled with Bipolar 1. Living in group home in [place]    Tremor  Has had tremor for years. Worse sx 5 years ago and much worse after recent fall last .  She can stop it. Worse with anxiety. Denies oral movements. Has been on many psychotropic medications overtime.Tremor is bilat hands. L>R. Difficult to hold coffee cup.    Meds:propranolol, lamictal. Has been on primidone in the past but unsure of max dose.    Denies slurred speech, dysphagia, trouble rolling over in bed, stiffness, vivid dreams. She doesn't sleep well. Denies hallucinations. She has had some falls due to poor balance. 2 in the last year. Fell to the side once and back once. Dtr denies bradykinesia or masked face.  No change in hand writing.  Meds:propranolol, lamictal      SCANNED - IMAGING (2024) -neg CTA H      PMH: Bipolar, anxiety, depression, htn, DM, delusions, PTSD  FH:-tremor  SH:, -tob, -etoh, -drug    Subjective      Past Medical History:   Past Medical  History:   Diagnosis Date   • Anxiety    • Depression    • Diabetes mellitus    • Hypertension        Past Surgical History:   Past Surgical History:   Procedure Laterality Date   • APPENDECTOMY     • CHOLECYSTECTOMY         Family History: History reviewed. No pertinent family history.    Social History:   Social History     Socioeconomic History   • Marital status:    Tobacco Use   • Smoking status: Never   • Smokeless tobacco: Never   Vaping Use   • Vaping Use: Never used   Substance and Sexual Activity   • Alcohol use: Never   • Drug use: Never   • Sexual activity: Defer       Medications:     Current Outpatient Medications:   •  Accu-Chek Softclix Lancets lancets, Use as instructed, Disp: 100 each, Rfl: 12  •  atorvastatin (LIPITOR) 20 MG tablet, Take 1 tablet by mouth Daily., Disp: 90 tablet, Rfl: 1  •  Blood Glucose Monitoring Suppl (ONE TOUCH ULTRA 2) w/Device kit, , Disp: , Rfl:   •  Brexpiprazole (Rexulti) 1 MG tablet, Take 1 mg by mouth Daily., Disp: 30 tablet, Rfl: 1  •  empagliflozin (JARDIANCE) 25 MG tablet tablet, Take 1 tablet by mouth Daily., Disp: 30 tablet, Rfl: 0  •  escitalopram (LEXAPRO) 5 MG tablet, Take 1 tablet by mouth Daily., Disp: 30 tablet, Rfl: 1  •  glucose blood (Accu-Chek Whit Plus) test strip, Check blood sugar daily, Disp: 30 each, Rfl: 12  •  hydrOXYzine (ATARAX) 10 MG tablet, Take 1 tablet by mouth At Night As Needed (sleep)., Disp: 30 tablet, Rfl: 1  •  lamoTRIgine (LaMICtal) 150 MG tablet, Take 1 tablet by mouth 2 (Two) Times a Day., Disp: 60 tablet, Rfl: 1  •  lisinopril (PRINIVIL,ZESTRIL) 40 MG tablet, Take 1 tablet by mouth Every Morning., Disp: 90 tablet, Rfl: 3  •  metFORMIN (GLUCOPHAGE) 500 MG tablet, Take 2 tablets by mouth 2 (Two) Times a Day With Meals., Disp: 360 tablet, Rfl: 0  •  multivitamin with minerals tablet tablet, Take 1 tablet by mouth Daily., Disp: , Rfl:   •  ondansetron (ZOFRAN) 4 MG tablet, Take 1 tablet by mouth Every 8 (Eight) Hours As Needed.  for nausea, Disp: , Rfl:   •  polycarbophil 625 MG tablet tablet, Take 1 tablet by mouth Daily As Needed., Disp: , Rfl:   •  SITagliptin (Januvia) 100 MG tablet, Take 1 tablet by mouth Daily., Disp: 30 tablet, Rfl: 2  •  primidone (MYSOLINE) 50 MG tablet, Take 1/2 tab q hs x 7 d, then 1 q hs x 7d, then 2 q hs, Disp: 120 tablet, Rfl: 5    Allergies:   Allergies   Allergen Reactions   • Phenothiazines Anaphylaxis   • Shellfish-Derived Products Anaphylaxis   • Phenylephrine Other (See Comments)     Patient does not know what her intolerance is   • Indomethacin Unknown - Low Severity   • Levofloxacin Other (See Comments)     'Felt like something was going to snap in the back of my leg'    • Seroquel Xr [Quetiapine Fumarate Er] Unknown - Low Severity       PHQ-9 Total Score:     KATERINA Fall Risk Assessment was completed, and patient is at HIGH risk for falls. Assessment completed on:2/6/2024    Objective     Physical Exam:   Physical Exam  Neurological:      Mental Status: She is oriented to person, place, and time.      Coordination: Finger-Nose-Finger Test abnormal.      Gait: Gait is intact.      Deep Tendon Reflexes:      Reflex Scores:       Tricep reflexes are 2+ on the right side and 2+ on the left side.       Bicep reflexes are 2+ on the right side and 2+ on the left side.       Brachioradialis reflexes are 2+ on the right side and 2+ on the left side.       Patellar reflexes are 2+ on the right side and 2+ on the left side.       Achilles reflexes are 2+ on the right side and 2+ on the left side.  Psychiatric:         Speech: Speech normal.         Neurologic Exam     Mental Status   Oriented to person, place, and time.   Follows 3 step commands.   Attention: normal. Concentration: normal.   Speech: speech is normal   Level of consciousness: alert  Knowledge: consistent with education.   Normal comprehension.     Cranial Nerves     CN III, IV, VI   Right pupil: Accommodation: intact.   Left pupil: Accommodation:  "intact.   CN III: no CN III palsy  CN VI: no CN VI palsy  Nystagmus: none   Diplopia: none  Upgaze: normal  Downgaze: normal  Conjugate gaze: present    CN VII   Facial expression full, symmetric.     CN VIII   Hearing: intact    CN XII   CN XII normal.     Motor Exam   Muscle bulk: normal  Overall muscle tone: normal    Strength   Right biceps: 5/5  Left biceps: 5/5  Right triceps: 5/5  Left triceps: 5/5  Right interossei: 5/5  Left interossei: 5/5  Right quadriceps: 5/5  Left quadriceps: 5/5  Right anterior tibial: 5/5  Left anterior tibial: 5/5  Right posterior tibial: 5/5  Left posterior tibial: 5/5    Sensory Exam   Light touch normal.     Gait, Coordination, and Reflexes     Gait  Gait: normal    Coordination   Finger to nose coordination: abnormal    Tremor   Resting tremor: present  Action tremor: left arm and right arm    Reflexes   Right brachioradialis: 2+  Left brachioradialis: 2+  Right biceps: 2+  Left biceps: 2+  Right triceps: 2+  Left triceps: 2+  Right patellar: 2+  Left patellar: 2+  Right achilles: 2+  Left achilles: 2+  Right : 2+  Left : 2+L> R tremor. Fingers make a tapping movements. Slight side to side movement of lower jaw. No movement of feet. Worse with action.        Vital Signs:   Vitals:    02/06/24 1330   BP: 116/58   Pulse: 66   SpO2: 97%   Weight: 60.8 kg (134 lb)   Height: 158.8 cm (62.52\")     Body mass index is 24.1 kg/m².         Assessment / Plan      Assessment/Plan:   Diagnoses and all orders for this visit:    1. Tremor (Primary)  -     MRI Brain With & Without Contrast; Future  -     TSH; Future  -     Vitamin B12 & Folate; Future  -     Ceruloplasmin; Future  -     Vitamin E; Future  -     CBC Auto Differential; Future  -     CK; Future  -     Comprehensive Metabolic Panel; Future  -     Magnesium; Future  -     primidone (MYSOLINE) 50 MG tablet; Take 1/2 tab q hs x 7 d, then 1 q hs x 7d, then 2 q hs  Dispense: 120 tablet; Refill: 5    2. Nonintractable headache, " unspecified chronicity pattern, unspecified headache type  -     MRI Brain With & Without Contrast; Future  -     TSH; Future  -     Vitamin B12 & Folate; Future  -     Ceruloplasmin; Future  -     Vitamin E; Future  -     CBC Auto Differential; Future  -     CK; Future  -     Comprehensive Metabolic Panel; Future  -     Magnesium; Future  -     primidone (MYSOLINE) 50 MG tablet; Take 1/2 tab q hs x 7 d, then 1 q hs x 7d, then 2 q hs  Dispense: 120 tablet; Refill: 5       Doubt PD at this time. More likely BET worse with anxiety. No true oral movements to consider TD.  Check labs and trial of mysoline titrate to above 250mg. Then may try CL or datscan.  Plan discussed with patient and daughter.      Patient Education:       Reviewed medications, potential side effects and signs and symptoms to report. Discussed risk versus benefits of treatment plan with patient and/or family-including medications, labs and radiology that may be ordered. Addressed questions and concerns during visit. Patient and/or family verbalized understanding and agree with plan. Instructed to call the office with any questions and report to ER with any life-threatening symptoms.     Follow Up:   Return in about 3 months (around 5/6/2024) for Recheck.    During this visit the following were done:  Labs Reviewed [x]    Labs Ordered [x]    Radiology Reports Reviewed [x]    Radiology Ordered []    PCP Records Reviewed []    Referring Provider Records Reviewed []    ER Records Reviewed []    Hospital Records Reviewed []    History Obtained From Family []    Radiology Images Reviewed []    Other Reviewed []    Records Requested []      Priti Estrada, FABBY, APRN

## 2024-02-06 NOTE — PROGRESS NOTES
Neuro Office Visit      Encounter Date: 2024   Patient Name: Coni Steele  : 1955   MRN: [id number]   PCP: Florence Moran  Chief Complaint:    Chief Complaint   Patient presents with    Tremors       History of Present Illness: Coni Steele is a 68 y.o. female who is here today in Neurology for  tremor. She is here with her daughter. She is disabled with Bipolar 1. Living in group home in [place]    Tremor  Has had tremor for years. Worse sx 5 years ago and much worse after recent fall last .  She can stop it. Worse with anxiety. Denies oral movements. Has been on many psychotropic medications overtime.Tremor is bilat hands. L>R. Difficult to hold coffee cup.    Meds:propranolol, lamictal. Has been on primidone in the past but unsure of max dose.    Denies slurred speech, dysphagia, trouble rolling over in bed, stiffness, vivid dreams. She doesn't sleep well. Denies hallucinations. She has had some falls due to poor balance. 2 in the last year. Fell to the side once and back once. Dtr denies bradykinesia or masked face.  No change in hand writing.  Meds:propranolol, lamictal      SCANNED - IMAGING (2024) -neg CTA H      PMH: Bipolar, anxiety, depression, htn, DM, delusions, PTSD  FH:-tremor  SH:, -tob, -etoh, -drug    Subjective      Past Medical History:   Past Medical History:   Diagnosis Date    Anxiety     Depression     Diabetes mellitus     Hypertension        Past Surgical History:   Past Surgical History:   Procedure Laterality Date    APPENDECTOMY      CHOLECYSTECTOMY         Family History: History reviewed. No pertinent family history.    Social History:   Social History     Socioeconomic History    Marital status:    Tobacco Use    Smoking status: Never    Smokeless tobacco: Never   Vaping Use    Vaping Use: Never used   Substance and Sexual Activity    Alcohol use: Never    Drug use: Never    Sexual activity: Defer       Medications:     Current  Outpatient Medications:     Accu-Chek Softclix Lancets lancets, Use as instructed, Disp: 100 each, Rfl: 12    atorvastatin (LIPITOR) 20 MG tablet, Take 1 tablet by mouth Daily., Disp: 90 tablet, Rfl: 1    Blood Glucose Monitoring Suppl (ONE TOUCH ULTRA 2) w/Device kit, , Disp: , Rfl:     Brexpiprazole (Rexulti) 1 MG tablet, Take 1 mg by mouth Daily., Disp: 30 tablet, Rfl: 1    empagliflozin (JARDIANCE) 25 MG tablet tablet, Take 1 tablet by mouth Daily., Disp: 30 tablet, Rfl: 0    escitalopram (LEXAPRO) 5 MG tablet, Take 1 tablet by mouth Daily., Disp: 30 tablet, Rfl: 1    glucose blood (Accu-Chek Whit Plus) test strip, Check blood sugar daily, Disp: 30 each, Rfl: 12    hydrOXYzine (ATARAX) 10 MG tablet, Take 1 tablet by mouth At Night As Needed (sleep)., Disp: 30 tablet, Rfl: 1    lamoTRIgine (LaMICtal) 150 MG tablet, Take 1 tablet by mouth 2 (Two) Times a Day., Disp: 60 tablet, Rfl: 1    lisinopril (PRINIVIL,ZESTRIL) 40 MG tablet, Take 1 tablet by mouth Every Morning., Disp: 90 tablet, Rfl: 3    metFORMIN (GLUCOPHAGE) 500 MG tablet, Take 2 tablets by mouth 2 (Two) Times a Day With Meals., Disp: 360 tablet, Rfl: 0    multivitamin with minerals tablet tablet, Take 1 tablet by mouth Daily., Disp: , Rfl:     ondansetron (ZOFRAN) 4 MG tablet, Take 1 tablet by mouth Every 8 (Eight) Hours As Needed. for nausea, Disp: , Rfl:     polycarbophil 625 MG tablet tablet, Take 1 tablet by mouth Daily As Needed., Disp: , Rfl:     SITagliptin (Januvia) 100 MG tablet, Take 1 tablet by mouth Daily., Disp: 30 tablet, Rfl: 2    primidone (MYSOLINE) 50 MG tablet, Take 1/2 tab q hs x 7 d, then 1 q hs x 7d, then 2 q hs, Disp: 120 tablet, Rfl: 5    Allergies:   Allergies   Allergen Reactions    Phenothiazines Anaphylaxis    Shellfish-Derived Products Anaphylaxis    Phenylephrine Other (See Comments)     Patient does not know what her intolerance is    Indomethacin Unknown - Low Severity    Levofloxacin Other (See Comments)     'Felt like  something was going to snap in the back of my leg'     Seroquel Xr [Quetiapine Fumarate Er] Unknown - Low Severity       PHQ-9 Total Score:     KATERINA Fall Risk Assessment was completed, and patient is at HIGH risk for falls. Assessment completed on:2/6/2024    Objective     Physical Exam:   Physical Exam  Neurological:      Mental Status: She is oriented to person, place, and time.      Coordination: Finger-Nose-Finger Test abnormal.      Gait: Gait is intact.      Deep Tendon Reflexes:      Reflex Scores:       Tricep reflexes are 2+ on the right side and 2+ on the left side.       Bicep reflexes are 2+ on the right side and 2+ on the left side.       Brachioradialis reflexes are 2+ on the right side and 2+ on the left side.       Patellar reflexes are 2+ on the right side and 2+ on the left side.       Achilles reflexes are 2+ on the right side and 2+ on the left side.  Psychiatric:         Speech: Speech normal.         Neurologic Exam     Mental Status   Oriented to person, place, and time.   Follows 3 step commands.   Attention: normal. Concentration: normal.   Speech: speech is normal   Level of consciousness: alert  Knowledge: consistent with education.   Normal comprehension.     Cranial Nerves     CN III, IV, VI   Right pupil: Accommodation: intact.   Left pupil: Accommodation: intact.   CN III: no CN III palsy  CN VI: no CN VI palsy  Nystagmus: none   Diplopia: none  Upgaze: normal  Downgaze: normal  Conjugate gaze: present    CN VII   Facial expression full, symmetric.     CN VIII   Hearing: intact    CN XII   CN XII normal.     Motor Exam   Muscle bulk: normal  Overall muscle tone: normal    Strength   Right biceps: 5/5  Left biceps: 5/5  Right triceps: 5/5  Left triceps: 5/5  Right interossei: 5/5  Left interossei: 5/5  Right quadriceps: 5/5  Left quadriceps: 5/5  Right anterior tibial: 5/5  Left anterior tibial: 5/5  Right posterior tibial: 5/5  Left posterior tibial: 5/5    Sensory Exam   Light touch  "normal.     Gait, Coordination, and Reflexes     Gait  Gait: normal    Coordination   Finger to nose coordination: abnormal    Tremor   Resting tremor: present  Action tremor: left arm and right arm    Reflexes   Right brachioradialis: 2+  Left brachioradialis: 2+  Right biceps: 2+  Left biceps: 2+  Right triceps: 2+  Left triceps: 2+  Right patellar: 2+  Left patellar: 2+  Right achilles: 2+  Left achilles: 2+  Right : 2+  Left : 2+L> R tremor. Fingers make a tapping movements. Slight side to side movement of lower jaw. No movement of feet. Worse with action.        Vital Signs:   Vitals:    02/06/24 1330   BP: 116/58   Pulse: 66   SpO2: 97%   Weight: 60.8 kg (134 lb)   Height: 158.8 cm (62.52\")     Body mass index is 24.1 kg/m².         Assessment / Plan      Assessment/Plan:   Diagnoses and all orders for this visit:    1. Tremor (Primary)  -     MRI Brain With & Without Contrast; Future  -     TSH; Future  -     Vitamin B12 & Folate; Future  -     Ceruloplasmin; Future  -     Vitamin E; Future  -     CBC Auto Differential; Future  -     CK; Future  -     Comprehensive Metabolic Panel; Future  -     Magnesium; Future  -     primidone (MYSOLINE) 50 MG tablet; Take 1/2 tab q hs x 7 d, then 1 q hs x 7d, then 2 q hs  Dispense: 120 tablet; Refill: 5    2. Nonintractable headache, unspecified chronicity pattern, unspecified headache type  -     MRI Brain With & Without Contrast; Future  -     TSH; Future  -     Vitamin B12 & Folate; Future  -     Ceruloplasmin; Future  -     Vitamin E; Future  -     CBC Auto Differential; Future  -     CK; Future  -     Comprehensive Metabolic Panel; Future  -     Magnesium; Future  -     primidone (MYSOLINE) 50 MG tablet; Take 1/2 tab q hs x 7 d, then 1 q hs x 7d, then 2 q hs  Dispense: 120 tablet; Refill: 5       Doubt PD at this time. More likely BET worse with anxiety. No true oral movements to consider TD.  Check labs and trial of mysoline titrate to above 250mg. Then may " try CL or datscan.  Plan discussed with patient and daughter.      Patient Education:       Reviewed medications, potential side effects and signs and symptoms to report. Discussed risk versus benefits of treatment plan with patient and/or family-including medications, labs and radiology that may be ordered. Addressed questions and concerns during visit. Patient and/or family verbalized understanding and agree with plan. Instructed to call the office with any questions and report to ER with any life-threatening symptoms.     Follow Up:   Return in about 3 months (around 5/6/2024) for Recheck.    During this visit the following were done:  Labs Reviewed [x]    Labs Ordered [x]    Radiology Reports Reviewed [x]    Radiology Ordered []    PCP Records Reviewed []    Referring Provider Records Reviewed []    ER Records Reviewed []    Hospital Records Reviewed []    History Obtained From Family []    Radiology Images Reviewed []    Other Reviewed []    Records Requested []      Priti Estrada, DNP, APRN

## 2024-02-07 ENCOUNTER — TELEPHONE (OUTPATIENT)
Dept: BEHAVIORAL HEALTH | Facility: CLINIC | Age: 69
End: 2024-02-07
Payer: MEDICARE

## 2024-02-07 DIAGNOSIS — F51.05 INSOMNIA DUE TO OTHER MENTAL DISORDER: Primary | ICD-10-CM

## 2024-02-07 DIAGNOSIS — F99 INSOMNIA DUE TO OTHER MENTAL DISORDER: Primary | ICD-10-CM

## 2024-02-07 RX ORDER — TRAZODONE HYDROCHLORIDE 50 MG/1
TABLET ORAL
Qty: 15 TABLET | Refills: 0 | Status: SHIPPED | OUTPATIENT
Start: 2024-02-07

## 2024-02-07 NOTE — TELEPHONE ENCOUNTER
Patient called and left message stating that the hydroxyzine is not helping with her insomnia at all and is wanting something else sent in to help her sleep.

## 2024-02-12 ENCOUNTER — NURSE TRIAGE (OUTPATIENT)
Dept: CALL CENTER | Facility: HOSPITAL | Age: 69
End: 2024-02-12
Payer: MEDICARE

## 2024-02-12 ENCOUNTER — TELEPHONE (OUTPATIENT)
Dept: FAMILY MEDICINE CLINIC | Facility: CLINIC | Age: 69
End: 2024-02-12
Payer: MEDICARE

## 2024-02-12 NOTE — TELEPHONE ENCOUNTER
Patient daughter called, patient blood sugar today after eating and taking her medication was 468. She is wanting to know what she needs to do when it is this high. She said that yesterday it was in 300s and Saturday was in 200s. Patient daughter requesting call back.

## 2024-02-20 ENCOUNTER — OFFICE VISIT (OUTPATIENT)
Dept: FAMILY MEDICINE CLINIC | Facility: CLINIC | Age: 69
End: 2024-02-20
Payer: MEDICARE

## 2024-02-20 VITALS
HEART RATE: 96 BPM | BODY MASS INDEX: 23.48 KG/M2 | WEIGHT: 132.5 LBS | SYSTOLIC BLOOD PRESSURE: 100 MMHG | HEIGHT: 63 IN | DIASTOLIC BLOOD PRESSURE: 70 MMHG | OXYGEN SATURATION: 97 %

## 2024-02-20 DIAGNOSIS — R51.9 NONINTRACTABLE HEADACHE, UNSPECIFIED CHRONICITY PATTERN, UNSPECIFIED HEADACHE TYPE: ICD-10-CM

## 2024-02-20 DIAGNOSIS — R25.1 TREMOR: ICD-10-CM

## 2024-02-20 DIAGNOSIS — M62.81 MUSCLE WEAKNESS OF LOWER EXTREMITY: ICD-10-CM

## 2024-02-20 DIAGNOSIS — R29.6 FREQUENT FALLS: ICD-10-CM

## 2024-02-20 DIAGNOSIS — E11.65 TYPE 2 DIABETES MELLITUS WITH HYPERGLYCEMIA, WITHOUT LONG-TERM CURRENT USE OF INSULIN: Primary | ICD-10-CM

## 2024-02-20 PROCEDURE — 1160F RVW MEDS BY RX/DR IN RCRD: CPT | Performed by: PHYSICIAN ASSISTANT

## 2024-02-20 PROCEDURE — 3078F DIAST BP <80 MM HG: CPT | Performed by: PHYSICIAN ASSISTANT

## 2024-02-20 PROCEDURE — 3074F SYST BP LT 130 MM HG: CPT | Performed by: PHYSICIAN ASSISTANT

## 2024-02-20 PROCEDURE — 1159F MED LIST DOCD IN RCRD: CPT | Performed by: PHYSICIAN ASSISTANT

## 2024-02-20 RX ORDER — INSULIN DETEMIR 100 [IU]/ML
10 INJECTION, SOLUTION SUBCUTANEOUS
Qty: 3 ML | Refills: 1 | Status: SHIPPED | OUTPATIENT
Start: 2024-02-20

## 2024-02-20 NOTE — ASSESSMENT & PLAN NOTE
I recommend that she use the rollator as well as do physical therapy for balance and strength.  Order sent to [facility].

## 2024-02-20 NOTE — ASSESSMENT & PLAN NOTE
Her diabetes is chronic and not currently controlled.  I advised her to continue current medications, and we will add a low-dose of insulin with her evening meal.  She will continue monitoring her blood sugars, and we will review on her next visit in 4 to 6 weeks.

## 2024-02-20 NOTE — PATIENT INSTRUCTIONS
Dr. Haskins  [facility]  [address] [place] [address]  [address]  [facility] [id number] [phone]

## 2024-02-20 NOTE — ASSESSMENT & PLAN NOTE
She has weakness in her lower extremities, so we will order physical therapy.  She will also get the rollator when approved by insurance next month.

## 2024-02-20 NOTE — PROGRESS NOTES
Office Note     Name: Coni Steele    : 1955     MRN: [id number]     Chief Complaint  Diabetes and Hospital Follow Up Visit    Subjective     History of Present Illness:  Coni Steele is a 68 y.o. female who presents today for eval of hyperglycemia with visit to the ER on 2024.  Her levels were running in the 400s and 500s, and she was feeling weak and dizzy.  [place] [facility] [place] sugar was [place] to 290 at that time.  She states that they were very rude to her and asked why she was there if it was not an emergency.  They did not find any acute issues, but she states that she has been taking her medications as directed.  She has been monitoring her blood sugars since she has been to the ER, and they have gotten down to under 200.    She has still not gotten the rollator, but [facility] said that her insurance will pay for it after .  She has been falling and had continued weakness.  She is interested in doing physical therapy through [facility] if it can be ordered.  She admits that she does not like to do the exercises, but she does feel better when she is more steady on her feet.    She states that she continues to have difficulty sleeping and with her mood, but G[facility] H[facility] has changed some of her medications.      She has been confused and had some memory issues. Her tremors have not improved on the increased dose of medication. She is supposed to have bloodwork and an MRI done for the neurologist, and she will f/u in May.       Past Medical History:   Past Medical History:   Diagnosis Date    Anxiety     Depression     Diabetes mellitus     Hypertension        Past Surgical History:   Past Surgical History:   Procedure Laterality Date    APPENDECTOMY      CHOLECYSTECTOMY         Family History: History reviewed. No pertinent family history.    Social History:   Social History     Socioeconomic History    Marital status:    Tobacco Use     Smoking status: Never    Smokeless tobacco: Never   Vaping Use    Vaping Use: Never used   Substance and Sexual Activity    Alcohol use: Never    Drug use: Never    Sexual activity: Defer       Immunizations:   Immunization History   Administered Date(s) Administered    COVID-19 (PFIZER) Purple Cap Monovalent 03/13/2021, 04/03/2021, 01/24/2022    Flu Vaccine Quad PF 6-35MO 10/15/2019    Fluzone (or Fluarix & Flulaval for VFC) >6mos 12/20/2021    Tdap 01/18/2019        Medications:     Current Outpatient Medications:     Accu-Chek Softclix Lancets lancets, Use as instructed, Disp: 100 each, Rfl: 12    atorvastatin (LIPITOR) 20 MG tablet, Take 1 tablet by mouth Daily., Disp: 90 tablet, Rfl: 1    Blood Glucose Monitoring Suppl (ONE TOUCH ULTRA 2) w/Device kit, , Disp: , Rfl:     Brexpiprazole (Rexulti) 1 MG tablet, Take 1 mg by mouth Daily., Disp: 30 tablet, Rfl: 1    empagliflozin (JARDIANCE) 25 MG tablet tablet, Take 1 tablet by mouth Daily., Disp: 30 tablet, Rfl: 0    escitalopram (LEXAPRO) 5 MG tablet, Take 1 tablet by mouth Daily., Disp: 30 tablet, Rfl: 1    glucose blood (Accu-Chek Whit Plus) test strip, Check blood sugar daily, Disp: 30 each, Rfl: 12    lamoTRIgine (LaMICtal) 150 MG tablet, Take 1 tablet by mouth 2 (Two) Times a Day., Disp: 60 tablet, Rfl: 1    lisinopril (PRINIVIL,ZESTRIL) 40 MG tablet, Take 1 tablet by mouth Every Morning., Disp: 90 tablet, Rfl: 3    metFORMIN (GLUCOPHAGE) 500 MG tablet, Take 2 tablets by mouth 2 (Two) Times a Day With Meals., Disp: 360 tablet, Rfl: 0    multivitamin with minerals tablet tablet, Take 1 tablet by mouth Daily., Disp: , Rfl:     ondansetron (ZOFRAN) 4 MG tablet, Take 1 tablet by mouth Every 8 (Eight) Hours As Needed. for nausea, Disp: , Rfl:     polycarbophil 625 MG tablet tablet, Take 1 tablet by mouth Daily As Needed., Disp: , Rfl:     primidone (MYSOLINE) 50 MG tablet, Take 1/2 tab q hs x 7 d, then 1 q hs x 7d, then 2 q hs, Disp: 120 tablet, Rfl: 5     "SITagliptin (Januvia) 100 MG tablet, Take 1 tablet by mouth Daily., Disp: 30 tablet, Rfl: 2    traZODone (DESYREL) 50 MG tablet, Take half of a tablet by mouth every night at bedtime as needed for sleep., Disp: 15 tablet, Rfl: 0    insulin detemir (Levemir FlexPen) 100 UNIT/ML injection, Inject 10 Units under the skin into the appropriate area as directed Daily With Dinner., Disp: 3 mL, Rfl: 1    Allergies:   Allergies   Allergen Reactions    Phenothiazines Anaphylaxis    Shellfish-Derived Products Anaphylaxis    Phenylephrine Other (See Comments)     Patient does not know what her intolerance is    Indomethacin Unknown - Low Severity    Levofloxacin Other (See Comments)     'Felt like something was going to snap in the back of my leg'     Seroquel Xr [Quetiapine Fumarate Er] Unknown - Low Severity       Objective     Vital Signs  /70 (BP Location: Left arm, Patient Position: Sitting, Cuff Size: Adult)   Pulse 96   Ht 158.8 cm (62.5\")   Wt 60.1 kg (132 lb 8 oz)   SpO2 97%   BMI 23.85 kg/m²   Estimated body mass index is 23.85 kg/m² as calculated from the following:    Height as of this encounter: 158.8 cm (62.5\").    Weight as of this encounter: 60.1 kg (132 lb 8 oz).    BMI is within normal parameters. No other follow-up for BMI required.      Physical Exam  Vitals reviewed.   Constitutional:       General: She is not in acute distress.     Appearance: Normal appearance. She is not ill-appearing.   HENT:      Head: Normocephalic and atraumatic.   Cardiovascular:      Rate and Rhythm: Normal rate and regular rhythm.      Pulses: Normal pulses.      Heart sounds: Normal heart sounds.   Pulmonary:      Effort: Pulmonary effort is normal. No respiratory distress.      Breath sounds: Normal breath sounds.   Neurological:      General: No focal deficit present.      Mental Status: She is alert and oriented to person, place, and time.      Motor: Weakness present.      Comments: Tremor at rest, which improves " with intention; no cogwheeling appreciated   Psychiatric:         Mood and Affect: Mood normal.         Behavior: Behavior normal.        Assessment and Plan     Assessment/Plan:  Diagnoses and all orders for this visit:    1. Type 2 diabetes mellitus with hyperglycemia, without long-term current use of insulin (Primary)  Assessment & Plan:  Her diabetes is chronic and not currently controlled.  I advised her to continue current medications, and we will add a low-dose of insulin with her evening meal.  She will continue monitoring her blood sugars, and we will review on her next visit in 4 to 6 weeks.    Orders:  -     insulin detemir (Levemir FlexPen) 100 UNIT/ML injection; Inject 10 Units under the skin into the appropriate area as directed Daily With Dinner.  Dispense: 3 mL; Refill: 1    2. Frequent falls  Assessment & Plan:  I recommend that she use the rollator as well as do physical therapy for balance and strength.  Order sent to [facility].    Orders:  -     Ambulatory Referral to Physical Therapy Evaluate and treat    3. Muscle weakness of lower extremity  Assessment & Plan:  She has weakness in her lower extremities, so we will order physical therapy.  She will also get the rollator when approved by insurance next month.    Orders:  -     Ambulatory Referral to Physical Therapy Evaluate and treat        Follow Up  Return for recheck on blood sugars in 4 to 6 weeks.    Florence Gonsales PA-C  [facility]

## 2024-02-21 ENCOUNTER — TELEPHONE (OUTPATIENT)
Dept: BEHAVIORAL HEALTH | Facility: CLINIC | Age: 69
End: 2024-02-21
Payer: MEDICARE

## 2024-02-21 DIAGNOSIS — F99 INSOMNIA DUE TO OTHER MENTAL DISORDER: Primary | ICD-10-CM

## 2024-02-21 DIAGNOSIS — F51.05 INSOMNIA DUE TO OTHER MENTAL DISORDER: Primary | ICD-10-CM

## 2024-02-21 DIAGNOSIS — F41.1 GENERALIZED ANXIETY DISORDER: ICD-10-CM

## 2024-02-21 LAB
ALBUMIN SERPL-MCNC: 4.5 G/DL (ref 3.9–4.9)
ALBUMIN/GLOB SERPL: 2.3 {RATIO} (ref 1.2–2.2)
ALP SERPL-CCNC: 91 IU/L (ref 44–121)
ALT SERPL-CCNC: 20 IU/L (ref 0–32)
AST SERPL-CCNC: 20 IU/L (ref 0–40)
BASOPHILS # BLD AUTO: 0 X10E3/UL (ref 0–0.2)
BASOPHILS NFR BLD AUTO: 0 %
BILIRUB SERPL-MCNC: 0.3 MG/DL (ref 0–1.2)
BUN SERPL-MCNC: 30 MG/DL (ref 8–27)
BUN/CREAT SERPL: 23 (ref 12–28)
CALCIUM SERPL-MCNC: 9.8 MG/DL (ref 8.7–10.3)
CERULOPLASMIN SERPL-MCNC: 22.3 MG/DL (ref 19–39)
CHLORIDE SERPL-SCNC: 98 MMOL/L (ref 96–106)
CK SERPL-CCNC: 36 U/L (ref 32–182)
CO2 SERPL-SCNC: 21 MMOL/L (ref 20–29)
CREAT SERPL-MCNC: 1.3 MG/DL (ref 0.57–1)
EGFRCR SERPLBLD CKD-EPI 2021: 44 ML/MIN/1.73
EOSINOPHIL # BLD AUTO: 0.1 X10E3/UL (ref 0–0.4)
EOSINOPHIL NFR BLD AUTO: 1 %
ERYTHROCYTE [DISTWIDTH] IN BLOOD BY AUTOMATED COUNT: 12.7 % (ref 11.7–15.4)
FOLATE SERPL-MCNC: >20 NG/ML
GLOBULIN SER CALC-MCNC: 2 G/DL (ref 1.5–4.5)
GLUCOSE SERPL-MCNC: 217 MG/DL (ref 70–99)
HCT VFR BLD AUTO: 37.4 % (ref 34–46.6)
HGB BLD-MCNC: 12.3 G/DL (ref 11.1–15.9)
IMM GRANULOCYTES # BLD AUTO: 0.1 X10E3/UL (ref 0–0.1)
IMM GRANULOCYTES NFR BLD AUTO: 1 %
LYMPHOCYTES # BLD AUTO: 1.7 X10E3/UL (ref 0.7–3.1)
LYMPHOCYTES NFR BLD AUTO: 20 %
MAGNESIUM SERPL-MCNC: 2 MG/DL (ref 1.6–2.3)
MCH RBC QN AUTO: 30.4 PG (ref 26.6–33)
MCHC RBC AUTO-ENTMCNC: 32.9 G/DL (ref 31.5–35.7)
MCV RBC AUTO: 93 FL (ref 79–97)
MONOCYTES # BLD AUTO: 0.4 X10E3/UL (ref 0.1–0.9)
MONOCYTES NFR BLD AUTO: 5 %
NEUTROPHILS # BLD AUTO: 6.4 X10E3/UL (ref 1.4–7)
NEUTROPHILS NFR BLD AUTO: 73 %
PLATELET # BLD AUTO: 244 X10E3/UL (ref 150–450)
POTASSIUM SERPL-SCNC: 4.7 MMOL/L (ref 3.5–5.2)
PROT SERPL-MCNC: 6.5 G/DL (ref 6–8.5)
RBC # BLD AUTO: 4.04 X10E6/UL (ref 3.77–5.28)
SODIUM SERPL-SCNC: 136 MMOL/L (ref 134–144)
TSH SERPL DL<=0.005 MIU/L-ACNC: 2.62 UIU/ML (ref 0.45–4.5)
VIT B12 SERPL-MCNC: 547 PG/ML (ref 232–1245)
WBC # BLD AUTO: 8.7 X10E3/UL (ref 3.4–10.8)

## 2024-02-21 RX ORDER — HYDROXYZINE HYDROCHLORIDE 25 MG/1
25 TABLET, FILM COATED ORAL NIGHTLY PRN
Qty: 30 TABLET | Refills: 0 | Status: SHIPPED | OUTPATIENT
Start: 2024-02-21

## 2024-02-21 NOTE — TELEPHONE ENCOUNTER
Patient daughter called and stated that patient is still not sleeping. Patient has been taking the full 50mg of trazodone and is still having a hard time sleeping. Please advise.

## 2024-02-21 NOTE — TELEPHONE ENCOUNTER
Stop trazodone, will replace with hydroxyzine, sent to pharmacy.  Will discuss results at upcoming appointment on 3/11/24.

## 2024-02-22 ENCOUNTER — OFFICE VISIT (OUTPATIENT)
Dept: FAMILY MEDICINE CLINIC | Facility: CLINIC | Age: 69
End: 2024-02-22
Payer: MEDICARE

## 2024-02-22 ENCOUNTER — TELEPHONE (OUTPATIENT)
Dept: NEUROLOGY | Facility: CLINIC | Age: 69
End: 2024-02-22
Payer: MEDICARE

## 2024-02-22 VITALS
DIASTOLIC BLOOD PRESSURE: 50 MMHG | HEART RATE: 80 BPM | TEMPERATURE: 96.6 F | BODY MASS INDEX: 23.76 KG/M2 | SYSTOLIC BLOOD PRESSURE: 92 MMHG | WEIGHT: 132 LBS | OXYGEN SATURATION: 97 %

## 2024-02-22 DIAGNOSIS — N30.00 ACUTE CYSTITIS WITHOUT HEMATURIA: Primary | ICD-10-CM

## 2024-02-22 LAB
BILIRUB BLD-MCNC: NEGATIVE MG/DL
CLARITY, POC: CLEAR
COLOR UR: YELLOW
EXPIRATION DATE: ABNORMAL
GLUCOSE UR STRIP-MCNC: ABNORMAL MG/DL
KETONES UR QL: NEGATIVE
LEUKOCYTE EST, POC: NEGATIVE
Lab: ABNORMAL
NITRITE UR-MCNC: NEGATIVE MG/ML
PH UR: 6 [PH] (ref 5–8)
PROT UR STRIP-MCNC: NEGATIVE MG/DL
RBC # UR STRIP: NEGATIVE /UL
SP GR UR: 1.01 (ref 1–1.03)
UROBILINOGEN UR QL: ABNORMAL

## 2024-02-22 PROCEDURE — 81003 URINALYSIS AUTO W/O SCOPE: CPT | Performed by: PHYSICIAN ASSISTANT

## 2024-02-22 PROCEDURE — 99213 OFFICE O/P EST LOW 20 MIN: CPT | Performed by: PHYSICIAN ASSISTANT

## 2024-02-22 PROCEDURE — 1160F RVW MEDS BY RX/DR IN RCRD: CPT | Performed by: PHYSICIAN ASSISTANT

## 2024-02-22 PROCEDURE — 3078F DIAST BP <80 MM HG: CPT | Performed by: PHYSICIAN ASSISTANT

## 2024-02-22 PROCEDURE — 3074F SYST BP LT 130 MM HG: CPT | Performed by: PHYSICIAN ASSISTANT

## 2024-02-22 PROCEDURE — 1159F MED LIST DOCD IN RCRD: CPT | Performed by: PHYSICIAN ASSISTANT

## 2024-02-22 RX ORDER — CEPHALEXIN 250 MG/1
250 CAPSULE ORAL 3 TIMES DAILY
Qty: 30 CAPSULE | Refills: 0 | Status: SHIPPED | OUTPATIENT
Start: 2024-02-22

## 2024-02-22 NOTE — ASSESSMENT & PLAN NOTE
I am going to treat her based on her symptoms with Keflex, but her Urinalysis was unremarkable, despite the cloudiness of her urine appearance.  Call or return if symptoms persist or worsen.  I told her I thought the back pain was most likely muscular and suggested that she take some Tylenol for that.

## 2024-02-22 NOTE — TELEPHONE ENCOUNTER
Called pt.  Left VM with results.       ----- Message from Priti Estrada DNP, APRN sent at 2/21/2024  4:55 PM EST -----  Please notify pt labs show elevated glucose and impaired kidney function which is close to her baseline. This should be followed by her PCP.  Copper metabolism, muscle breakdown, magnesium, vit b12, folate, thyroid, blood counts are all normal.

## 2024-02-28 LAB
A-TOCOPHEROL VIT E SERPL-MCNC: 16.7 MG/L (ref 9–29)
GAMMA TOCOPHEROL SERPL-MCNC: 1.2 MG/L (ref 0.5–4.9)

## 2024-02-29 ENCOUNTER — TELEPHONE (OUTPATIENT)
Dept: NEUROLOGY | Facility: CLINIC | Age: 69
End: 2024-02-29
Payer: MEDICARE

## 2024-02-29 NOTE — TELEPHONE ENCOUNTER
"Called patient and left vm with results.      ----- Message from Priti Estrada DNP, APRN sent at 2/28/2024  4:48 PM EST -----  Relay     \"Please notify pt vit E is normal.\"                "